# Patient Record
Sex: MALE | Race: WHITE | NOT HISPANIC OR LATINO | Employment: FULL TIME | URBAN - METROPOLITAN AREA
[De-identification: names, ages, dates, MRNs, and addresses within clinical notes are randomized per-mention and may not be internally consistent; named-entity substitution may affect disease eponyms.]

---

## 2017-01-09 ENCOUNTER — TELEPHONE (OUTPATIENT)
Dept: UROLOGY | Facility: CLINIC | Age: 52
End: 2017-01-09

## 2017-01-09 DIAGNOSIS — N20.0 NEPHROLITHIASIS: Primary | ICD-10-CM

## 2017-01-09 RX ORDER — ALLOPURINOL 300 MG/1
300 TABLET ORAL DAILY
Qty: 90 TABLET | Refills: 3 | Status: SHIPPED | OUTPATIENT
Start: 2017-01-09 | End: 2017-11-26 | Stop reason: SDUPTHER

## 2017-01-09 NOTE — TELEPHONE ENCOUNTER
----- Message from Dae Valente MD sent at 1/9/2017  9:15 AM CST -----  Reviewed 24 hour urinary results:   Discussed elevated urinary sodium, oxalate, and uric acid.  Recommend low oxalate, low sodium, low animal protein diet.  Also allopurinol.   Also discussed weight loss for control of kidney stones. Can continue k-citrate.   Also ordered CMP, please arrange.  Will repeat 24 hour urine in 6 months.

## 2017-01-10 ENCOUNTER — LAB VISIT (OUTPATIENT)
Dept: LAB | Facility: HOSPITAL | Age: 52
End: 2017-01-10
Attending: UROLOGY
Payer: COMMERCIAL

## 2017-01-10 DIAGNOSIS — N20.0 NEPHROLITHIASIS: ICD-10-CM

## 2017-01-10 PROCEDURE — 80053 COMPREHEN METABOLIC PANEL: CPT

## 2017-01-10 PROCEDURE — 36415 COLL VENOUS BLD VENIPUNCTURE: CPT | Mod: PO

## 2017-01-11 LAB
ALBUMIN SERPL BCP-MCNC: 3.8 G/DL
ALP SERPL-CCNC: 78 U/L
ALT SERPL W/O P-5'-P-CCNC: 42 U/L
ANION GAP SERPL CALC-SCNC: 10 MMOL/L
AST SERPL-CCNC: 28 U/L
BILIRUB SERPL-MCNC: 0.5 MG/DL
BUN SERPL-MCNC: 21 MG/DL
CALCIUM SERPL-MCNC: 8.8 MG/DL
CHLORIDE SERPL-SCNC: 106 MMOL/L
CO2 SERPL-SCNC: 22 MMOL/L
CREAT SERPL-MCNC: 1.3 MG/DL
EST. GFR  (AFRICAN AMERICAN): >60 ML/MIN/1.73 M^2
EST. GFR  (NON AFRICAN AMERICAN): >60 ML/MIN/1.73 M^2
GLUCOSE SERPL-MCNC: 174 MG/DL
POTASSIUM SERPL-SCNC: 4.3 MMOL/L
PROT SERPL-MCNC: 6.7 G/DL
SODIUM SERPL-SCNC: 138 MMOL/L

## 2017-03-13 ENCOUNTER — HOSPITAL ENCOUNTER (OUTPATIENT)
Dept: RADIOLOGY | Facility: HOSPITAL | Age: 52
Discharge: HOME | End: 2017-03-13
Attending: INTERNAL MEDICINE
Payer: COMMERCIAL

## 2017-03-13 DIAGNOSIS — N18.2 CHRONIC KIDNEY DISEASE (CKD), STAGE II (MILD): ICD-10-CM

## 2017-03-13 PROCEDURE — 93975 VASCULAR STUDY: CPT | Mod: TC

## 2017-03-13 PROCEDURE — 93975 VASCULAR STUDY: CPT | Mod: 26,,, | Performed by: RADIOLOGY

## 2017-03-13 PROCEDURE — 76770 US EXAM ABDO BACK WALL COMP: CPT | Mod: 26,,, | Performed by: RADIOLOGY

## 2017-11-27 RX ORDER — ALLOPURINOL 300 MG/1
TABLET ORAL
Qty: 90 TABLET | Refills: 3 | Status: SHIPPED | OUTPATIENT
Start: 2017-11-27 | End: 2018-05-17 | Stop reason: SDUPTHER

## 2018-05-17 ENCOUNTER — OFFICE VISIT (OUTPATIENT)
Dept: UROLOGY | Facility: CLINIC | Age: 53
End: 2018-05-17
Payer: COMMERCIAL

## 2018-05-17 VITALS
BODY MASS INDEX: 40.09 KG/M2 | WEIGHT: 280 LBS | SYSTOLIC BLOOD PRESSURE: 136 MMHG | DIASTOLIC BLOOD PRESSURE: 87 MMHG | HEART RATE: 85 BPM | HEIGHT: 70 IN

## 2018-05-17 DIAGNOSIS — Z12.5 PROSTATE CANCER SCREENING: ICD-10-CM

## 2018-05-17 DIAGNOSIS — N20.0 NEPHROLITHIASIS: Primary | ICD-10-CM

## 2018-05-17 DIAGNOSIS — N52.9 ERECTILE DYSFUNCTION, UNSPECIFIED ERECTILE DYSFUNCTION TYPE: ICD-10-CM

## 2018-05-17 PROCEDURE — 87086 URINE CULTURE/COLONY COUNT: CPT

## 2018-05-17 PROCEDURE — 81002 URINALYSIS NONAUTO W/O SCOPE: CPT | Mod: S$GLB,,, | Performed by: UROLOGY

## 2018-05-17 PROCEDURE — 99215 OFFICE O/P EST HI 40 MIN: CPT | Mod: 25,S$GLB,, | Performed by: UROLOGY

## 2018-05-17 PROCEDURE — 3008F BODY MASS INDEX DOCD: CPT | Mod: CPTII,S$GLB,, | Performed by: UROLOGY

## 2018-05-17 PROCEDURE — 99999 PR PBB SHADOW E&M-EST. PATIENT-LVL III: CPT | Mod: PBBFAC,,, | Performed by: UROLOGY

## 2018-05-17 RX ORDER — ALLOPURINOL 300 MG/1
300 TABLET ORAL DAILY
Qty: 90 TABLET | Refills: 3 | Status: SHIPPED | OUTPATIENT
Start: 2018-05-17

## 2018-05-17 RX ORDER — GLYBURIDE-METFORMIN HYDROCHLORIDE 5; 500 MG/1; MG/1
TABLET ORAL
COMMUNITY

## 2018-05-17 RX ORDER — ALLOPURINOL 300 MG/1
TABLET ORAL
COMMUNITY
End: 2018-05-17

## 2018-05-17 RX ORDER — AZITHROMYCIN 250 MG/1
TABLET, FILM COATED ORAL
COMMUNITY
Start: 2018-04-13 | End: 2018-12-20 | Stop reason: CLARIF

## 2018-05-17 RX ORDER — CIPROFLOXACIN 500 MG/1
TABLET ORAL
Refills: 0 | COMMUNITY
Start: 2018-05-08 | End: 2018-12-20 | Stop reason: CLARIF

## 2018-05-17 RX ORDER — SILDENAFIL CITRATE 20 MG/1
TABLET ORAL
Qty: 20 TABLET | Refills: 11 | Status: SHIPPED | OUTPATIENT
Start: 2018-05-17 | End: 2019-06-27 | Stop reason: SDUPTHER

## 2018-05-17 RX ORDER — PANTOPRAZOLE SODIUM 40 MG/1
TABLET, DELAYED RELEASE ORAL
COMMUNITY
End: 2018-05-17

## 2018-05-17 RX ORDER — DULAGLUTIDE 0.75 MG/.5ML
INJECTION, SOLUTION SUBCUTANEOUS
Refills: 5 | COMMUNITY
Start: 2018-04-30 | End: 2022-10-07

## 2018-05-17 RX ORDER — ATORVASTATIN CALCIUM 20 MG/1
TABLET, FILM COATED ORAL
COMMUNITY
End: 2018-12-20 | Stop reason: CLARIF

## 2018-05-17 RX ORDER — AMITRIPTYLINE HYDROCHLORIDE 10 MG/1
TABLET, FILM COATED ORAL
Refills: 2 | COMMUNITY
Start: 2018-05-07 | End: 2022-10-07

## 2018-05-17 RX ORDER — PREGABALIN 75 MG/1
CAPSULE ORAL
Refills: 0 | COMMUNITY
Start: 2018-03-06 | End: 2018-12-20 | Stop reason: CLARIF

## 2018-05-17 RX ORDER — ATORVASTATIN CALCIUM 40 MG/1
TABLET, FILM COATED ORAL
COMMUNITY

## 2018-05-17 RX ORDER — OXYCODONE AND ACETAMINOPHEN 5; 325 MG/1; MG/1
TABLET ORAL
Refills: 0 | Status: ON HOLD | COMMUNITY
Start: 2018-05-07 | End: 2018-12-28 | Stop reason: HOSPADM

## 2018-05-17 RX ORDER — RAMIPRIL 5 MG/1
CAPSULE ORAL
COMMUNITY

## 2018-05-17 RX ORDER — LEVOCETIRIZINE DIHYDROCHLORIDE 5 MG/1
TABLET, FILM COATED ORAL
Refills: 0 | COMMUNITY
Start: 2018-05-08 | End: 2018-12-20 | Stop reason: CLARIF

## 2018-05-17 RX ORDER — POTASSIUM CITRATE 10 MEQ/1
TABLET, EXTENDED RELEASE ORAL
Qty: 180 TABLET | Refills: 3 | Status: SHIPPED | OUTPATIENT
Start: 2018-05-17 | End: 2018-12-20 | Stop reason: CLARIF

## 2018-05-17 RX ORDER — BENZONATATE 200 MG/1
CAPSULE ORAL
Refills: 0 | COMMUNITY
Start: 2018-05-08 | End: 2018-12-20 | Stop reason: CLARIF

## 2018-05-17 RX ORDER — DICLOFENAC SODIUM 20 MG/G
SOLUTION TOPICAL
Refills: 3 | COMMUNITY
Start: 2018-04-03 | End: 2022-10-07

## 2018-05-17 RX ORDER — PROMETHAZINE HYDROCHLORIDE AND DEXTROMETHORPHAN HYDROBROMIDE 6.25; 15 MG/5ML; MG/5ML
SYRUP ORAL
Refills: 0 | COMMUNITY
Start: 2018-05-08 | End: 2018-12-20 | Stop reason: CLARIF

## 2018-05-17 RX ORDER — FENOFIBRATE 145 MG/1
TABLET, FILM COATED ORAL
COMMUNITY

## 2018-05-18 LAB — BACTERIA UR CULT: NO GROWTH

## 2018-05-18 NOTE — PROGRESS NOTES
HPI:  Erick Espinosa is a 53 y.o. year old male that  presents with No chief complaint on file.  .  This patient referred himself to the office with possible kidney stones.  Patient is new to me however last saw [facility] December 2016 whose note is reviewed.  This shows history of uric acid stones on potassium citrate.  At that time he was also prescribed allopurinol.  CT scan was obtained which showed no stones.  This image reviewed by me agree with this finding.  Twenty-four urine was obtained with results as detailed in the chart.  Patient has prior ureteroscopy with laser lithotripsy    Patient states he has a strong stream with nocturia times 0 1 and no strain to void.  He has no dysuria    Patient also complains of difficulty maintaining erections    There is no family history of kidney bladder or prostate cancer.  In January 2017 GFR greater than 60 and in 2010 PSA was 0.27.  In 2014 urine culture was negative    Patient states that he has occasional right flank pain.  Patient has no nausea vomiting      Past Medical History:   Diagnosis Date    Diabetes mellitus     Diabetes mellitus type II     High triglycerides     Hypertension     takes for kidneys    Kidney stone     Urinary tract infection      Social History     Social History    Marital status:      Spouse name: N/A    Number of children: N/A    Years of education: N/A     Occupational History    Not on file.     Social History Main Topics    Smoking status: Former Smoker     Types: Cigarettes     Quit date: 1/1/2009    Smokeless tobacco: Never Used    Alcohol use No    Drug use: No    Sexual activity: Yes     Partners: Female     Other Topics Concern    Not on file     Social History Narrative    No narrative on file     Past Surgical History:   Procedure Laterality Date    EXTRACORPOREAL SHOCK WAVE LITHOTRIPSY      LITHOTRIPSY       Family History   Problem Relation Age of Onset    Diabetes Mother     Heart  "attack Mother     Heart attack Father     Prostate cancer Neg Hx     Kidney disease Neg Hx            Review of Systems  The patient has no chest pains.  The patient has no shortness of breath  Patient wears        glasses.  All other review of systems are negative.      Physical Exam:  /87 (BP Location: Right arm, Patient Position: Sitting)   Pulse 85   Ht 5' 10" (1.778 m)   Wt 127 kg (280 lb)   BMI 40.18 kg/m²   General appearance: alert, cooperative, no distress  Constitutional:Oriented to person, place, and time.appears well-developed and well-nourished.   HEENT: Normocephalic, atraumatic, neck symmetrical, no nasal discharge   Eyes: conjunctivae/corneas clear, PERRL, EOM's intact  Lungs: clear to auscultation bilaterally, no dullness to percussion bilaterally  Heart: regular rate and rhythm without rub; no displacement of the PMI   Abdomen: soft, non-tender; bowel sounds normoactive; no organomegaly  :  Penis/perineum without lesions, scrotum without rash/cysts, epididymis nontender bilaterally, urethral meatus in normal location normal size, no penile plaques palpated, prostate:    Smooth enlarged benign-feeling                   seminal vesicles not palpated.  No rectal masses, sphincter tone normal.  Testes equal in size without masses  Extremities: extremities symmetric; no clubbing, cyanosis, or edema  Integument: Skin color, texture, turgor normal; no rashes; hair distrubution normal  Neurologic: Alert and oriented X 3, normal strength, normal coordination and gait  Psychiatric: no pressured speech; normal affect; no evidence of impaired cognition     LABS:    Complete Blood Count  Lab Results   Component Value Date    RBC 5.63 06/05/2013    HGB 14.5 08/15/2014    HCT 42.4 08/15/2014    MCV 82.1 06/05/2013    MCH 28.4 06/05/2013    MCHC 34.6 06/05/2013    RDW 13.6 06/05/2013     06/05/2013    MPV 10.6 06/05/2013    GRAN 3.8 06/05/2013    GRAN 56.2 06/05/2013    LYMPH 33.6 06/05/2013 "    LYMPH 2.3 06/05/2013    MONO 7.3 06/05/2013    MONO 0.5 06/05/2013    EOS 0.1 06/05/2013    BASO 0.1 06/05/2013    EOSINOPHIL 1.5 06/05/2013    BASOPHIL 0.7 06/05/2013       Comprehensive Metabolic Panel  Lab Results   Component Value Date     (H) 01/10/2017    BUN 21 (H) 01/10/2017    CREATININE 1.3 01/10/2017     01/10/2017    K 4.3 01/10/2017     01/10/2017    PROT 6.7 01/10/2017    ALBUMIN 3.8 01/10/2017    BILITOT 0.5 01/10/2017    AST 28 01/10/2017    ALKPHOS 78 01/10/2017    CO2 22 (L) 01/10/2017    ALT 42 01/10/2017    ANIONGAP 10 01/10/2017    EGFRNONAA >60.0 01/10/2017    ESTGFRAFRICA >60.0 01/10/2017       PSA  Lab Results   Component Value Date    PSA 0.27 02/08/2010         Assessment:    ICD-10-CM ICD-9-CM    1. Nephrolithiasis N20.0 592.0 Urine culture      Basic metabolic panel   2. Erectile dysfunction, unspecified erectile dysfunction type N52.9 607.84    3. Prostate cancer screening Z12.5 V76.44 PSA, Screening     The primary encounter diagnosis was Nephrolithiasis. Diagnoses of Erectile dysfunction, unspecified erectile dysfunction type and Prostate cancer screening were also pertinent to this visit.      Plan:  1.  Nephrolithiasis.  Plan.  Prescriptions renewed for Urocit-K and allopurinol.  The I discussed repeat 24 urine however patient does not want to have this done.  Will check stone protocol CT which the patient states he wants to have done at [facility].  We will contact the patient with the results    2.  Erectile dysfunction.  Plan.  Discussed that this is related to his diabetes and hypertension.  Risks and benefits of generic Viagra discussed  Prescription therefore written for sildenafil 20 mg.    The patient instructed to start with 1 by mouth 1 hour before sex and increase in a stepwise fashion to maximum 5 by mouth 1 hour before sex.   Risks  of sildenafil including headache, upset stomach, change in the way that colors look, and prolonged erection  discussed with the patient.  The need to go to emergency room if he has an erection lasting more than 4 hours discussed.  If the patient is on an alpha-blocker, the need for  a 4 hour time difference between taking the sildenafil and the alpha-blocker discussed.  Patient voices understanding.  Prescription for sildenafil 20 mg given.     3.  Prostate cancer screening.  I discussed risks and benefits and controversy concerning prostate cancer screening.  Patient voices understanding and wants to have it done.  Will contact patient with result    Follow-up 1 year or sooner as dictated by above mentioned tests  Orders Placed This Encounter   Procedures    Urine culture    Basic metabolic panel    PSA, Screening           Trevor Veronica MD    PLEASE NOTE:  Please be advised that portions of this note were dictated using voice recognition software and may contain dictation related errors in spelling/grammar/appropriate pronouns/syntax or other errors that might have not been found and or corrected on text review.

## 2018-05-21 ENCOUNTER — TELEPHONE (OUTPATIENT)
Dept: UROLOGY | Facility: CLINIC | Age: 53
End: 2018-05-21

## 2018-05-21 ENCOUNTER — LAB VISIT (OUTPATIENT)
Dept: LAB | Facility: HOSPITAL | Age: 53
End: 2018-05-21
Attending: UROLOGY
Payer: COMMERCIAL

## 2018-05-21 DIAGNOSIS — Z12.5 PROSTATE CANCER SCREENING: ICD-10-CM

## 2018-05-21 DIAGNOSIS — N20.0 NEPHROLITHIASIS: ICD-10-CM

## 2018-05-21 LAB
ANION GAP SERPL CALC-SCNC: 9 MMOL/L
BILIRUB SERPL-MCNC: ABNORMAL MG/DL
BLOOD URINE, POC: ABNORMAL
BUN SERPL-MCNC: 25 MG/DL
CALCIUM SERPL-MCNC: 10.3 MG/DL
CHLORIDE SERPL-SCNC: 106 MMOL/L
CO2 SERPL-SCNC: 24 MMOL/L
COLOR, POC UA: YELLOW
COMPLEXED PSA SERPL-MCNC: 0.55 NG/ML
CREAT SERPL-MCNC: 1.4 MG/DL
EST. GFR  (AFRICAN AMERICAN): >60 ML/MIN/1.73 M^2
EST. GFR  (NON AFRICAN AMERICAN): 58 ML/MIN/1.73 M^2
GLUCOSE SERPL-MCNC: 111 MG/DL
GLUCOSE UR QL STRIP: 250
KETONES UR QL STRIP: ABNORMAL
LEUKOCYTE ESTERASE URINE, POC: ABNORMAL
NITRITE, POC UA: ABNORMAL
PH, POC UA: 5
POTASSIUM SERPL-SCNC: 4.3 MMOL/L
PROTEIN, POC: ABNORMAL
SODIUM SERPL-SCNC: 139 MMOL/L
SPECIFIC GRAVITY, POC UA: 1
UROBILINOGEN, POC UA: NORMAL

## 2018-05-21 PROCEDURE — 80048 BASIC METABOLIC PNL TOTAL CA: CPT

## 2018-05-21 PROCEDURE — 36415 COLL VENOUS BLD VENIPUNCTURE: CPT

## 2018-05-21 PROCEDURE — 84153 ASSAY OF PSA TOTAL: CPT

## 2018-05-21 NOTE — TELEPHONE ENCOUNTER
----- Message from Trevor Veronica MD sent at 5/21/2018  8:03 AM CDT -----  Please contact patient and let patient know that recent urine culture was negative.

## 2018-05-23 ENCOUNTER — TELEPHONE (OUTPATIENT)
Dept: UROLOGY | Facility: CLINIC | Age: 53
End: 2018-05-23

## 2018-05-23 NOTE — TELEPHONE ENCOUNTER
----- Message from Evie Mujica sent at 5/23/2018  2:39 PM CDT -----  Contact: [phone]/self  Patient requesting to speak with you regarding his CT scan orders. Please advise.

## 2018-05-23 NOTE — TELEPHONE ENCOUNTER
Return pt.call he wanted to know was the order for an CT fax to [facility]. Orders was fax over and I told pt.he may call them to find out about how long will it take for an appt.

## 2018-05-23 NOTE — TELEPHONE ENCOUNTER
----- Message from Julieta Martinez sent at 5/23/2018  3:30 PM CDT -----  Contact: edinson with [facility] [phone] ref # [id number]  Rep needed to speak with the nurse about the potassium citrate (UROCIT-K) 10 mEq (1,080 mg) Tb SR. Rep advised they tablets come 100 to a bottle and wanted to know if they can give the patient 2 bottles (200 tablets). Please call and advise.

## 2018-05-23 NOTE — TELEPHONE ENCOUNTER
Spoke with pharmacist, to dispense 2 bottles of Urocit-K 10 meq quantity of 100 tablets per bottle as this will maximize patient's pharmacy benefits for 90 day supply.

## 2018-05-24 ENCOUNTER — TELEPHONE (OUTPATIENT)
Dept: UROLOGY | Facility: CLINIC | Age: 53
End: 2018-05-24

## 2018-05-24 NOTE — TELEPHONE ENCOUNTER
----- Message from Trevor Veronica MD sent at 5/24/2018 11:06 AM CDT -----  Contact patient and inform him that serum PSA was normal

## 2018-05-29 ENCOUNTER — TELEPHONE (OUTPATIENT)
Dept: UROLOGY | Facility: CLINIC | Age: 53
End: 2018-05-29

## 2018-05-29 NOTE — TELEPHONE ENCOUNTER
----- Message from Socorro Mello sent at 5/29/2018  4:17 PM CDT -----  Contact: Sade w/ [facility] [phone]      ----- Message -----  From: Milana Hernandez  Sent: 5/29/2018   2:09 PM  To: Jeremías Stuart is requesting an authorization for a CT scan. Please advise.

## 2018-09-26 ENCOUNTER — DOCUMENTATION ONLY (OUTPATIENT)
Dept: UROLOGY | Facility: CLINIC | Age: 53
End: 2018-09-26

## 2018-09-26 NOTE — PROGRESS NOTES
CT result from [facility] dated 06/05/2018 shows no evidence of stone or obstruction.  No made of stable asymmetry of the kidneys with right greater than left.    Reading is to be scanned into the computer

## 2018-10-17 ENCOUNTER — HOSPITAL ENCOUNTER (OUTPATIENT)
Dept: RADIOLOGY | Facility: HOSPITAL | Age: 53
Discharge: HOME | End: 2018-10-17
Attending: ORTHOPAEDIC SURGERY
Payer: COMMERCIAL

## 2018-10-17 ENCOUNTER — OFFICE VISIT (OUTPATIENT)
Dept: SPORTS MEDICINE | Facility: CLINIC | Age: 53
End: 2018-10-17
Payer: COMMERCIAL

## 2018-10-17 VITALS
DIASTOLIC BLOOD PRESSURE: 83 MMHG | SYSTOLIC BLOOD PRESSURE: 131 MMHG | HEIGHT: 70 IN | WEIGHT: 280 LBS | HEART RATE: 81 BPM | BODY MASS INDEX: 40.09 KG/M2

## 2018-10-17 DIAGNOSIS — M25.511 RIGHT SHOULDER PAIN, UNSPECIFIED CHRONICITY: ICD-10-CM

## 2018-10-17 DIAGNOSIS — M75.41 ROTATOR CUFF IMPINGEMENT SYNDROME OF RIGHT SHOULDER: Primary | ICD-10-CM

## 2018-10-17 PROCEDURE — 99999 PR PBB SHADOW E&M-EST. PATIENT-LVL III: CPT | Mod: PBBFAC,,, | Performed by: ORTHOPAEDIC SURGERY

## 2018-10-17 PROCEDURE — 73030 X-RAY EXAM OF SHOULDER: CPT | Mod: TC,FY,PO,RT

## 2018-10-17 PROCEDURE — 73030 X-RAY EXAM OF SHOULDER: CPT | Mod: 26,RT,, | Performed by: RADIOLOGY

## 2018-10-17 PROCEDURE — 3008F BODY MASS INDEX DOCD: CPT | Mod: CPTII,S$GLB,, | Performed by: ORTHOPAEDIC SURGERY

## 2018-10-17 PROCEDURE — 20610 DRAIN/INJ JOINT/BURSA W/O US: CPT | Mod: RT,S$GLB,, | Performed by: ORTHOPAEDIC SURGERY

## 2018-10-17 PROCEDURE — 99204 OFFICE O/P NEW MOD 45 MIN: CPT | Mod: 25,S$GLB,, | Performed by: ORTHOPAEDIC SURGERY

## 2018-10-17 RX ORDER — TRIAMCINOLONE ACETONIDE 40 MG/ML
80 INJECTION, SUSPENSION INTRA-ARTICULAR; INTRAMUSCULAR
Status: DISCONTINUED | OUTPATIENT
Start: 2018-10-17 | End: 2018-10-17 | Stop reason: HOSPADM

## 2018-10-17 RX ADMIN — TRIAMCINOLONE ACETONIDE 80 MG: 40 INJECTION, SUSPENSION INTRA-ARTICULAR; INTRAMUSCULAR at 12:10

## 2018-10-17 NOTE — PROCEDURES
Large Joint Aspiration/Injection: R subacromial bursa  Date/Time: 10/17/2018 12:35 PM  Performed by: Malgorzata Hall MD  Authorized by: Malgorzata Hall MD     Consent Done?:  Yes (Verbal)  Indications:  Pain  Procedure site marked: Yes    Timeout: Prior to procedure the correct patient, procedure, and site was verified      Location:  Shoulder  Site:  R subacromial bursa  Prep: Patient was prepped and draped in usual sterile fashion    Needle size:  22 G  Approach:  Posterior  Medications:  80 mg triamcinolone acetonide 40 mg/mL  Patient tolerance:  Patient tolerated the procedure well with no immediate complications

## 2018-10-17 NOTE — Clinical Note
October 17, 2018      [facility]            [facility]  [address]  [facility] [address]  Phone: [phone]          Patient: Erick Espinosa   MR Number: [id number]   YOB: 1965   Date of Visit: 10/17/2018       Dear [facility] :    Thank you for referring Erick Espinosa to me for evaluation. Attached you will find relevant portions of my assessment and plan of care.    If you have questions, please do not hesitate to call me. I look forward to following Erick Espinosa along with you.    Sincerely,    Malgorzata Hall MD    Enclosure  CC:  No Recipients    If you would like to receive this communication electronically, please contact [email or web address] or [phone] to request more information on [employer] access.    For providers and/or their staff who would like to refer a patient to [facility], please contact us through our one-stop-shop provider referral line, Elizabeth Joseph, at [phone].    If you feel you have received this communication in error or would no longer like to receive these types of communications, please e-mail [email or web address]

## 2018-10-17 NOTE — PROGRESS NOTES
CC: right shoulder pain     53 y.o. right hand dominant Male [employer]  reports that the pain is severe and not responding to any conservative care. He points to the anterolateral shoulder as the location of his pain. Pain has been present for the past 2 months. He denies trauma or injury. He endorses decreased ROM. He has had no prior treatment.     He reports that the pain is worse with overhead activity. It also bothers him at night.    Is affecting ADLs.     He had a left shoulder arthroscopic rotator cuff tear repair by Dr. Ajay Bolaños previously. 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     Review of Systems   Constitution: Negative. Negative for chills, fever and night sweats.   HENT: Negative for congestion and headaches.    Eyes: Negative for blurred vision, left vision loss and right vision loss.   Cardiovascular: Negative for chest pain and syncope.   Respiratory: Negative for cough and shortness of breath.    Endocrine: Negative for polydipsia, polyphagia and polyuria.   Hematologic/Lymphatic: Negative for bleeding problem. Does not bruise/bleed easily.   Skin: Negative for dry skin, itching and rash.   Musculoskeletal: Negative for falls and muscle weakness.   Gastrointestinal: Negative for abdominal pain and bowel incontinence.   Genitourinary: Negative for bladder incontinence and nocturia.   Neurological: Negative for disturbances in coordination, loss of balance and seizures.   Psychiatric/Behavioral: Negative for depression. The patient does not have insomnia.    Allergic/Immunologic: Negative for hives and persistent infections.     PAST MEDICAL HISTORY:   Past Medical History:   Diagnosis Date    Diabetes mellitus     Diabetes mellitus type II     High triglycerides     Hypertension     takes for kidneys    Kidney stone     Urinary tract infection      PAST SURGICAL HISTORY:   Past Surgical History:   Procedure  Laterality Date    CYSTOSCOPY WITH RETROGRADE PYELOGRAM Left 2014    [facility]    CYSTOSCOPY WITH STENT PLACEMENT Left 2014    [facility]    CYSTOSCOPY WITH STENT PLACEMENT Left 2014    [facility]    EXTRACORPOREAL SHOCK WAVE LITHOTRIPSY      EXTRACTION-STONE-URETEROSCOPY Left 2014    [facility]    LITHOTRIPSY      REMOVAL-STENT-URETERAL Left 10/8/2014    [facility] at [facility]     FAMILY HISTORY:   Family History   Problem Relation Age of Onset    Diabetes Mother     Heart attack Mother     Heart attack Father     Prostate cancer Neg Hx     Kidney disease Neg Hx      SOCIAL HISTORY:   Social History     Socioeconomic History    Marital status:      Spouse name: Not on file    Number of children: Not on file    Years of education: Not on file    Highest education level: Not on file   Social Needs    Financial resource strain: Not on file    Food insecurity - worry: Not on file    Food insecurity - inability: Not on file    Transportation needs - medical: Not on file    Transportation needs - non-medical: Not on file   Occupational History    Not on file   Tobacco Use    Smoking status: Former Smoker     Types: Cigarettes     Last attempt to quit: 2009     Years since quittin.7    Smokeless tobacco: Never Used   Substance and Sexual Activity    Alcohol use: No    Drug use: No    Sexual activity: Yes     Partners: Female   Other Topics Concern    Not on file   Social History Narrative    Not on file       MEDICATIONS:   Current Outpatient Medications:     allopurinol (ZYLOPRIM) 300 MG tablet, Take 1 tablet (300 mg total) by mouth once daily., Disp: 90 tablet, Rfl: 3    atorvastatin (LIPITOR) 40 MG tablet, atorvastatin 40 mg tablet, Disp: , Rfl:     canagliflozin (INVOKANA) 100 mg Tab, Invokana  "100 mg tablet, Disp: , Rfl:     fenofibrate (TRICOR) 145 MG tablet, fenofibrate nanocrystallized 145 mg tablet, Disp: , Rfl:     glyBURIDE-metformin 5-500 mg (GLUCOVANCE) 5-500 mg Tab, Take 2 tablets by mouth 2 (two) times daily with meals., Disp: 360 tablet, Rfl: 3    oxyCODONE-acetaminophen (PERCOCET) 5-325 mg per tablet, TK 1 T PO BID PRN P, Disp: , Rfl: 0    PENNSAID 20 mg/gram /actuation(2 %) sopm, APPLY 2 PUMPS TO THE AFFECTED KNEE(S) TWICE DAILY, Disp: , Rfl: 3    ramipril (ALTACE) 5 MG capsule, ramipril 5 mg capsule, Disp: , Rfl:     TRULICITY 0.75 mg/0.5 mL PnIj, INJECT 0.75 MG ONCE A WEEK, Disp: , Rfl: 5    amitriptyline (ELAVIL) 10 MG tablet, TK 1 T PO QHS MAY INCREASE TO 2 TS PO QHS AFTER 5 DAYS, Disp: , Rfl: 2    atorvastatin (LIPITOR) 20 MG tablet, atorvastatin 20 mg tablet, Disp: , Rfl:     azithromycin (Z-JALYN) 250 MG tablet, , Disp: , Rfl:     BD INSULIN PEN NEEDLE UF MINI 31 x 3/16 " Ndle, , Disp: , Rfl:     benzonatate (TESSALON) 200 MG capsule, TK 1 C PO TID PRF COUGH, Disp: , Rfl: 0    canagliflozin (INVOKANA) 300 mg Tab tablet, Invokana 300 mg tablet, Disp: , Rfl:     ciprofloxacin HCl (CIPRO) 500 MG tablet, TK 1 T PO  BID, Disp: , Rfl: 0    diazePAM (VALIUM) 5 MG tablet, Take 1 tablet by mouth as needed., Disp: , Rfl: 2    ERGOCALCIFEROL, VITAMIN D2, (VITAMIN D ORAL), Take 1 tablet by mouth once daily., Disp: , Rfl:     gemfibrozil (LOPID) 600 MG tablet, Take 1 tablet by mouth once daily., Disp: , Rfl:     glyBURIDE-metformin 5-500 mg (GLUCOVANCE) 5-500 mg Tab, glyburide 5 mg-metformin 500 mg tablet, Disp: , Rfl:     ibuprofen (ADVIL,MOTRIN) 800 MG tablet, Take 1 tablet by mouth daily as needed., Disp: , Rfl: 0    INVOKANA 100 mg Tab, Take 1 tablet by mouth once daily., Disp: , Rfl: 1    levocetirizine (XYZAL) 5 MG tablet, TK 1 T PO QD, Disp: , Rfl: 0    linagliptin (TRADJENTA) 5 mg Tab tablet, Tradjenta 5 mg tablet, Disp: , Rfl:     LYRICA 75 mg capsule, TK 1 C PO BID, " "Disp: , Rfl: 0    mv with min-FA-lycopene-ginkgo (ONE-A-DAY MEN'S 50+ ADVANTAGE) 400-300-120 mcg-mcg-mg Tab, Take 1 tablet by mouth once daily., Disp: , Rfl:     NOVOFINE 32 32 x 1/4 " Ndle, , Disp: , Rfl:     potassium citrate (UROCIT-K) 10 mEq (1,080 mg) TbSR, 1 po bid, Disp: 180 tablet, Rfl: 3    promethazine-dextromethorphan (PROMETHAZINE-DM) 6.25-15 mg/5 mL Syrp, , Disp: , Rfl: 0    ramipril (ALTACE) 5 MG capsule, , Disp: , Rfl:     sildenafil (REVATIO) 20 mg Tab, Take 1 by mouth 1 hour prior to sex, increased stepwise as needed  up to max 5 by mouth 1 hourr prior to sex, Disp: 20 tablet, Rfl: 11    zolpidem (AMBIEN) 10 mg Tab, Take 1 tablet by mouth nightly as needed., Disp: , Rfl: 3  ALLERGIES:   Review of patient's allergies indicates:   Allergen Reactions    Sulfa (sulfonamide antibiotics)        VITAL SIGNS: /83   Pulse 81   Ht 5' 10" (1.778 m)   Wt 127 kg (280 lb)   BMI 40.18 kg/m²      PHYSICAL EXAMINATION:  General:  The patient is alert and oriented x 3.  Mood is pleasant.  Observation of ears, eyes and nose reveal no gross abnormalities.  No labored breathing observed.  Gait is coordinated. Patient can toe walk and heel walk without difficulty.      right Shoulder / Upper Extremity Exam    OBSERVATION:     Swelling  none  Deformity  none   Discoloration  none   Scapular winging none   Scars   none  Atrophy  none    TENDERNESS / CREPITUS (T/C):          T/C      T/C   Clavicle   -/-  SUPRAspinatus    -/-     AC Jt.    -/-  INFRAspinatus  -/-    SC Jt.    -/-  Deltoid    -/-      G. Tuberosity  -/-  LH BICEP groove  +/-   Acromion:  -/-  Midline Neck   -/-     Scapular Spine -/-  Trapezium   -/-   SMA Scapula  -/-  GH jt. line - post  -/-     Scapulothoracic  -/-         ROM: (* = with pain)  Right shoulder   Left shoulder        AROM (PROM)   AROM (PROM)   FE    160° (160°)     170° (175°)     ER at 0°    45°  (65°)    40°  (65°)   ER at 90° ABD  90°  (90°)    80°  (80°)   IR at 90° "  ABD   NA  (40°)     NA  (40°)      IR (spine level)   L5     T10    STRENGTH: (* = with pain) RIGHT SHOULDER  LEFT SHOULDER   SCAPTION at 0  5/5*    5/5   SCAPTION at 30  5/5*    5/5    IR    5/5    5/5   ER    5/5    5/5   BICEPS   5/5    5/5   Deltoid    5/5    5/5     SIGNS:  Painful side       NEER   ++    ORODRIS  +    DASH   +    SPEEDS  neg     DROP ARM   neg   BELLY PRESS neg   Superior escape none    LIFT-OFF  neg   X-Body ADD    neg    MOVING VALGUS neg        STABILITY TESTING    RIGHT SHOULDER   LEFT SHOULDER     Translation     Anterior  up face     up face    Posterior  up face    up face    Sulcus   < 10mm    < 10 mm     Signs   Apprehension   neg      neg       Relocation   no change     no change      Jerk test  neg     neg    EXTREMITY NEURO-VASCULAR EXAM    Sensation grossly intact to light touch all dermatomal regions.    DTR 2+ Biceps, Triceps, BR and Negative Annas sign   Grossly intact motor function at Elbow, Wrist and Hand   Distal pulses radial and ulnar 2+, brisk cap refill, symmetric.      NECK:  Painless FROM and spinous processes non-tender. Negative Spurlings sign.      OTHER FINDINGS:  + mild scapular dyskinesia    XRAYS:  Shoulder trauma series right,  were obtained and reviewed  No convincing fracture or dislocation is noted. The osseous structures appear well mineralized and well aligned    MRI:  NA      ASSESSMENT:   right:  1. Shoulder pain, impingement   2.  Mild Scapular dyskinesia    PLAN:    1. PT for scapular stabilization: Scapular dyskinesia   Scapular stabilization - [place] protocol, 1-3x/week x 6-8 weeks with HEP    2. Subacromial injection, see note below   3. MRI right shoulder if pain not improving  4. Follow up 3 months    PROCEDURE NOTE:   After cortisone consent and post-injection information was given, the shoulder was prepped with betadyne and alcohol. The right subacromial space of the shoulder was injected with 2 cc 40 mg kenalog and 4 cc  lidocaine and 4 cc .5% marcaine.   Bandaid was applied. Patient was reminded to rest with RICE for 48 hours post injection and to call clinic immediately for any adverse side effects as explained in clinic today.          All questions were answered, pt will contact us for questions or concerns in the interim.

## 2018-10-22 ENCOUNTER — CLINICAL SUPPORT (OUTPATIENT)
Dept: REHABILITATION | Facility: HOSPITAL | Age: 53
End: 2018-10-22
Payer: COMMERCIAL

## 2018-10-22 DIAGNOSIS — M25.511 RIGHT SHOULDER PAIN, UNSPECIFIED CHRONICITY: ICD-10-CM

## 2018-10-22 PROCEDURE — 97161 PT EVAL LOW COMPLEX 20 MIN: CPT

## 2018-10-22 PROCEDURE — 97110 THERAPEUTIC EXERCISES: CPT

## 2018-10-23 NOTE — PATIENT INSTRUCTIONS
"YOUR HOMHEo PmROeGR EAMxercise Program  Created by miguel angel simon Oct 9th, 2018  View at "[email or web address]-code.[email or web address]" using code: [email or web address]  Total 4  Repeat 10 Times  Hold 3 Seconds  Complete 3 Sets  Perform 1 Time(s) a Day  Supine Serratus Punch  Lying on your back, raise both arms in front  to 90 degrees. Punch your fists towards the  ceiling lifting shoulder blades off of the mat.  Do not shrug shoulders.  Repeat 15 Times  Hold 1 Second  Complete 2 Sets  Perform 1 Time(s) a Day  FREE WEIGHT - EXTERNAL ROTATION -  ER  Lie on your side and hold a weight with your  elbow bent and rested on your side. Next,  draw up the your arm from the ground  towards the ceiling.  Repeat 15 Times  Complete 2 Sets  Perform 1 Time(s) a Day  PRONE MIDDLE TRAP  Lie on your stomach with your arm hanging  off table. Try to pull your shoulder blade  towards your spine (without shrugging), and  slowly lift your arm out to the side with your  palm up. STOP if there is any pain in the  shoulder.  Powered by [email or web address] Created By BULXa Oct 9th, 2018 - Page 1 of 2  Repeat 15 Times  Hold 1 Second  Complete 2 Sets  Perform 1 Time(s) a Day  Wall slides  For Improved Shoulder Flexion ROM:  Use wall as a guide and gently slide your  arms up  For Upward Rotation (Serratus Activation):  Push into wall as your arms slide  Think of letting your shoulder blades rotate  *Engage abdominals to prevent rib flare  and/or lumbar extension  Powered by [email or web address] Created By BULXa Oct 9th, 2018 - Page 2 of 2  "

## 2018-10-23 NOTE — PLAN OF CARE
[facility]  Physical Therapy Initial Evaluation    Name: Erick Espinosa  Clinic Number: [id number]    Therapy Diagnosis:   Encounter Diagnosis   Name Primary?    Right shoulder pain, unspecified chronicity      Physician: Jonathan Solis MD    Physician Orders: PT Eval and Treat  Medical Diagnosis: Right shoulder pain  Evaluation Date: 10/22/2018  Authorization Period Expiration: 12/31/2018  Plan of Care Certification Period: 2/25/2019  Visit # / Visits authorized: 1/20  Date of Surgery: NA  Precautions: Standard    Time In: 1234p  Time Out: 130p  Total Billable Time: 56 minutes    Subjective     Date of onset: Insidious over last 2 months  History of current condition - Erick reports: that he started having pain in right shoulder about 2 months ago with no specific mechanism.  He states that he is now having pain at times at rest, quick movements, reaching across body, reaching overhead, reaching out to the side.  He states that he has difficulty with sleeping due to pain.  He states that he does get short periods of relief from ice and tylenol.  He states that he feels pain is staying about the same over the last week.     Past Medical History:   Diagnosis Date    Diabetes mellitus     Diabetes mellitus type II     High triglycerides     Hypertension     takes for kidneys    Kidney stone     Urinary tract infection      Erick Espinosa  has a past surgical history that includes Lithotripsy; Extracorporeal shock wave lithotripsy; REMOVAL-STENT-URETERAL (Left, 10/8/2014); EXTRACTION-STONE-URETEROSCOPY (Left, 8/20/2014); CYSTOSCOPY WITH STENT PLACEMENT (Left, 8/20/2014); CYSTOSCOPY WITH RETROGRADE PYELOGRAM (Left, 8/1/2014); and CYSTOSCOPY WITH STENT PLACEMENT (Left, 8/1/2014).    Erick has a current medication list which includes the following prescription(s): allopurinol, amitriptyline, atorvastatin, atorvastatin, azithromycin, bd ultra-fine mini pen needle,  benzonatate, canagliflozin, canagliflozin, ciprofloxacin hcl, diazepam, ergocalciferol (vitamin d2), fenofibrate, gemfibrozil, glyburide-metformin 5-500 mg, glyburide-metformin 5-500 mg, ibuprofen, invokana, levocetirizine, linagliptin, lyrica, mv-mins-folic-lycopene-ginkgo, novofine 32, oxycodone-acetaminophen, pennsaid, potassium citrate, promethazine-dextromethorphan, ramipril, ramipril, sildenafil, trulicity, and zolpidem.    Review of patient's allergies indicates:   Allergen Reactions    Sulfa (sulfonamide antibiotics)         Prior Therapy: For previous rotator cuff repair  Social History: Lives in a house, lives with their spouse  Occupation: , [employer]  Prior Level of Function: No limitation with left UE reaching, lifting, carrying  Current Level of Function: Left UE limited ROM, pain with lifting, carrying, pushing, pulling    Pain:  Current 1/10, worst 6/10, best 1/10   Location: right shoulder   Description: Aching, Dull and Sharp    Pts goals: To resume full ROM with right UE    Objective     Scapular alignment:  Right scapula abducted and downwardly rotated    Range of Motion:   Shoulder Right (AROM/PROM) Left (AROM/PROM)   Flexion 101* degrees 161 degrees   Abduction 104* degrees 157 degrees   ER at 90 44 degrees 41 degrees   IR at 90 47* degrees 57 degrees     Strength:  Shoulder Right Left   Flexion pain/5 5/5   Abduction pain/5 4+/5   ER 4/5 4/5   IR 4+/5 5/5       Special Tests:  AC Joint Right Left   AC Joint Compression Test - -   Empty Can Test + -   Drop Arm test - -   Subscaputlaris Lift Off - -   Clunk test - -   Hawkin's Kenndy + -   Neer's Test + -   Speed's test - -   Anterior Apprehension test - -     CMS Impairment/Limitation/Restriction for FOTO Shoulder Survey    Therapist reviewed FOTO scores for Erick Espinosa on 10/22/2018.   FOTO documents entered into [employer] - see Media section.    Limitation Score: 59%  Category: Mobility    Current : CK = at least 40% but <  "60% impaired, limited or restricted  Goal: CJ = at least 20% but < 40% impaired, limited or restricted  Discharge:          TREATMENT     Treatment Time In: 1245p  Treatment Time Out: 120p  Total Treatment time separate from Evaluation time:35    Erick received therapeutic exercises to develop strength, endurance, ROM and posture for 35 minutes including:  SL ER - 2 x 15, 2#  Serratus punch - 2 x 15  Prone mid traps - 2 x 15  Sleeper stretch - 4 x 15"  Wall slides flexion - 2 x 10    Erick received the following manual therapy techniques for 0 minutes including:    Home Exercises and Patient Education Provided    Education provided re: therapeutic diagnosis and plan of care    Written Home Exercises Provided: Yes  Exercises were reviewed and Erick was able to demonstrate them prior to the end of the session.   Pt received a written copy of exercises to perform at home. Erick demonstrated good  understanding of the education provided.     See EMR under patient instructions for exercises given.     Assessment     Erick is a 53 y.o. male referred to outpatient Physical Therapy with a medical diagnosis of right shoulder pain that began about 2 months ago with no specific mechanism of onset. Pt presents with objective deficits in right shoulder P/AROM, tolerance for resisted motion, scapular movement timing that limits functional ability to reach overhead, reach behind back, lift, carry, sleep.    Pt prognosis is Good.   Pt will benefit from skilled outpatient Physical Therapy to address the deficits stated above and in the chart below, provide pt/family education, and to maximize pt's level of independence.     Plan of care discussed with patient: Yes  Pt's spiritual, cultural and educational needs considered and patient is agreeable to the plan of care and goals as stated below:     Anticipated Barriers for therapy: None    Medical Necessity is demonstrated by the following  History  Co-morbidities and personal factors " that may impact the plan of care Co-morbidities:   None    Personal Factors:   no deficits     low   Examination  Body Structures and Functions, activity limitations and participation restrictions that may impact the plan of care Body Regions:   upper extremities    Body Systems:    ROM  strength    Participation Restrictions:   None    Activity limitations:   Learning and applying knowledge  no deficits    General Tasks and Commands  no deficits    Communication  no deficits    Mobility  lifting and carrying objects    Self care  no deficits    Domestic Life  no deficits    Interactions/Relationships  no deficits    Life Areas  no deficits    Community and Social Life  no deficits         low   Clinical Presentation stable and uncomplicated low   Decision Making/ Complexity Score: low       Goals:  Short Term Goals:  4 weeks  1.Patient will demonstrate 155 degrees affected shoulder flexion PROM to demonstrate progression to functional AROM.  2.Patient will demonstrate 155 degrees affected shoulder abduction PROM to demonstrate progression to functional AROM.   3.Patient will demonstrate 4/5 strength of affected shoulder flexion to increase ease with reaching overhead.    Long Term Goals: 18 weeks  1. .Patient will demonstrate 160 degrees affected shoulder flexion and abduction AROM to demonstrate progression to functional AROM  2.Increase strength to >/= 4+/5 in shoulder flexion and abduction to increase tolerance for ADL and work activities.  3. Pt goal: To resume reaching and carrying without pain.    Plan   Certification Period/Plan of care expiration: 10/22/2018 to 2/25/2019.    Outpatient Physical Therapy 2 times weekly for 18 weeks to include the following interventions: manual therapy as needed, therapeutic exercises to address functional deficits, modalities prn, wound care prn, dry needling prn, treatment by a skilled PTA at times.    Cuco Guidry, PT

## 2018-10-30 ENCOUNTER — CLINICAL SUPPORT (OUTPATIENT)
Dept: REHABILITATION | Facility: HOSPITAL | Age: 53
End: 2018-10-30
Payer: COMMERCIAL

## 2018-10-30 DIAGNOSIS — M25.511 RIGHT SHOULDER PAIN, UNSPECIFIED CHRONICITY: ICD-10-CM

## 2018-10-30 PROCEDURE — 97140 MANUAL THERAPY 1/> REGIONS: CPT

## 2018-10-30 PROCEDURE — 97110 THERAPEUTIC EXERCISES: CPT

## 2018-10-30 NOTE — PROGRESS NOTES
"                            Physical Therapy Daily Treatment Note     Name: Erick Espinosa  Clinic Number: [id number]    Therapy Diagnosis:   Encounter Diagnosis   Name Primary?    Right shoulder pain, unspecified chronicity      Physician: Jonathan Solis MD  Physician Orders: PT Eval and Treat  Medical Diagnosis: Right shoulder pain  Evaluation Date: 10/22/2018  Authorization Period Expiration: 12/31/2018  Plan of Care Certification Period: 2/25/2019  Date of Surgery: NA  Precautions: Standard  Visit Date: 10/30/2018  Visit # / Visits authorized: 1/20    Time In: 1100a  Time Out: 1215p   Total Billable Time: 75 minutes    Subjective      Pt reports: he was compliant with home exercise program given last session.   Response to previous treatment: Patient states that he feels a little more pain today, 2/10 on average     Pain: 2/10  Location: right shoulder      Objective     Erick received therapeutic exercises to develop strength, ROM and posture for 40 minutes including:  Pulleys - 3' ea  Prone row/ext/mid trap/low trap - 3 x 10, 2#  Row - 2 x 20, blue, 5"  Er/IR step out - 3 x 10, green  Sleeper stretch - 4 x 20"    Erick received the following manual therapy techniques for 15 minutes, including:  PROM right shoulder all planes    Home Exercises Provided and Patient Education Provided     Education provided:   Continue current HEP    Written Home Exercises Provided: Continue with current HEP  Exercises were reviewed and Erick was able to demonstrate them prior to the end of the session.      Pt received a written copy of exercises to perform at home.   See EMR under patient instructions for exercises given.     Erick demonstrated good  understanding of the education provided.     Assessment     The patient demonstrates relief from pain with IR stretching and anterior joint stabilization  Erick is progressing well towards his goals.   Pt prognosis is Excellent.     Pt will continue to benefit from " skilled outpatient physical therapy to address the deficits listed in the problem list box on initial evaluation, provide pt/family education and to maximize pt's level of independence in the home and community environment.     Pt's spiritual, cultural and educational needs considered and pt agreeable to plan of care and goals.    Anticipated barriers to physical therapy: None    Goals:   Short Term Goals:  4 weeks  1.Patient will demonstrate 155 degrees affected shoulder flexion PROM to demonstrate progression to functional AROM.  2.Patient will demonstrate 155 degrees affected shoulder abduction PROM to demonstrate progression to functional AROM.   3.Patient will demonstrate 4/5 strength of affected shoulder flexion to increase ease with reaching overhead.     Long Term Goals: 18 weeks  1. .Patient will demonstrate 160 degrees affected shoulder flexion and abduction AROM to demonstrate progression to functional AROM  2.Increase strength to >/= 4+/5 in shoulder flexion and abduction to increase tolerance for ADL and work activities.  3. Pt goal: To resume reaching and carrying without pain.    Plan     Progress resisted strengthening    Cuco Guidry, PT

## 2018-11-01 ENCOUNTER — CLINICAL SUPPORT (OUTPATIENT)
Dept: REHABILITATION | Facility: HOSPITAL | Age: 53
End: 2018-11-01
Payer: COMMERCIAL

## 2018-11-01 DIAGNOSIS — M25.511 RIGHT SHOULDER PAIN, UNSPECIFIED CHRONICITY: ICD-10-CM

## 2018-11-01 PROCEDURE — 97140 MANUAL THERAPY 1/> REGIONS: CPT

## 2018-11-01 PROCEDURE — 97110 THERAPEUTIC EXERCISES: CPT

## 2018-11-01 NOTE — PROGRESS NOTES
Physical Therapy Daily Treatment Note     Name: Erick Espinosa  Clinic Number: [id number]    Therapy Diagnosis:   Encounter Diagnosis   Name Primary?    Right shoulder pain, unspecified chronicity      Physician: Jonathan Solis MD  Physician Orders: PT Eval and Treat  Medical Diagnosis: Right shoulder pain  Evaluation Date: 10/22/2018  Authorization Period Expiration: 12/31/2018  Plan of Care Certification Period: 2/25/2019  Date of Surgery: NA  Precautions: Standard  Visit Date: 11/1/2018  Visit # / Visits authorized: 2/20    Time In: 1000a  Time Out: 1115a   Total Billable Time: 75 minutes    Subjective      Pt reports: he was compliant with home exercise program given last session.   Response to previous treatment: Patient states that he felt good for a day after last session, feels more pain after installing lightbulbs last night     Pain: 2/10  Location: right shoulder      Objective     Erick received therapeutic exercises to develop strength, ROM and posture for 40 minutes including:    Pulleys - 3' ea  Prone row/ext/mid trap/low trap - 3 x 10, 2#  IR/ER 3 x 10 BTB  Row 3 x 10 BTB   Ext. 3 x 10 BTB  Bent over row 3 x 10 BTB      Erick received the following manual therapy techniques for 15 minutes, including:  PROM right shoulder all planes    Home Exercises Provided and Patient Education Provided     Education provided:   Continue current HEP    Written Home Exercises Provided: Continue with current HEP  Exercises were reviewed and Erick was able to demonstrate them prior to the end of the session.      Pt received a written copy of exercises to perform at home.   See EMR under patient instructions for exercises given.     Erick demonstrated good  understanding of the education provided.     Assessment     The patient has increased pain with posterior joint mobs and IR stretching  Erick is progressing well towards his goals.   Pt prognosis is Excellent.     Pt will  continue to benefit from skilled outpatient physical therapy to address the deficits listed in the problem list box on initial evaluation, provide pt/family education and to maximize pt's level of independence in the home and community environment.     Pt's spiritual, cultural and educational needs considered and pt agreeable to plan of care and goals.    Anticipated barriers to physical therapy: None    Goals:   Short Term Goals:  4 weeks  1.Patient will demonstrate 155 degrees affected shoulder flexion PROM to demonstrate progression to functional AROM.  2.Patient will demonstrate 155 degrees affected shoulder abduction PROM to demonstrate progression to functional AROM.   3.Patient will demonstrate 4/5 strength of affected shoulder flexion to increase ease with reaching overhead.     Long Term Goals: 18 weeks  1. .Patient will demonstrate 160 degrees affected shoulder flexion and abduction AROM to demonstrate progression to functional AROM  2.Increase strength to >/= 4+/5 in shoulder flexion and abduction to increase tolerance for ADL and work activities.  3. Pt goal: To resume reaching and carrying without pain.    Plan     Progress resisted strengthening    Cuco Guidry, PT

## 2018-11-07 ENCOUNTER — CLINICAL SUPPORT (OUTPATIENT)
Dept: REHABILITATION | Facility: HOSPITAL | Age: 53
End: 2018-11-07
Payer: COMMERCIAL

## 2018-11-07 DIAGNOSIS — M25.511 RIGHT SHOULDER PAIN, UNSPECIFIED CHRONICITY: ICD-10-CM

## 2018-11-07 PROCEDURE — 97110 THERAPEUTIC EXERCISES: CPT

## 2018-11-07 PROCEDURE — 97140 MANUAL THERAPY 1/> REGIONS: CPT

## 2018-11-07 NOTE — PROGRESS NOTES
Physical Therapy Daily Treatment Note     Name: Erick Espinosa  Clinic Number: [id number]    Therapy Diagnosis:   Encounter Diagnosis   Name Primary?    Right shoulder pain, unspecified chronicity      Physician: Jonathan Solis MD  Physician Orders: PT Eval and Treat  Medical Diagnosis: Right shoulder pain  Evaluation Date: 10/22/2018  Authorization Period Expiration: 12/31/2018  Plan of Care Certification Period: 2/25/2019  Date of Surgery: NA  Precautions: Standard  Visit Date: 11/7/2018  Visit # / Visits authorized: 4/20    Time In: 1100a  Time Out: 1215p   Total Billable Time: 75 minutes    Subjective      Pt reports: he was compliant with home exercise program given last session.   Response to previous treatment: Patient states that he is feeling less pain and has been doing less lifting and carrying at home    Pain: 2/10  Location: right shoulder      Objective     Erick received therapeutic exercises to develop strength, ROM and posture for 40 minutes including:    Pulleys - 3' ea  Prone row/ext/mid trap/low trap - 3 x 10, 2#  IR/ER 3 x 10 BTB  Row 3 x 10 BTB   Ext. 3 x 10 BTB  Bent over row 3 x 10 BTB      Erick received the following manual therapy techniques for 15 minutes, including:  PROM right shoulder all planes    Home Exercises Provided and Patient Education Provided     Education provided:   Continue current HEP    Written Home Exercises Provided: Continue with current HEP  Exercises were reviewed and Erick was able to demonstrate them prior to the end of the session.      Pt received a written copy of exercises to perform at home.   See EMR under patient instructions for exercises given.     Erick demonstrated good  understanding of the education provided.     Assessment     The patient demonstrates improved symptoms with posterior cuff strengthening    Erick is progressing well towards his goals.   Pt prognosis is Excellent.     Pt will continue to benefit  from skilled outpatient physical therapy to address the deficits listed in the problem list box on initial evaluation, provide pt/family education and to maximize pt's level of independence in the home and community environment.     Pt's spiritual, cultural and educational needs considered and pt agreeable to plan of care and goals.    Anticipated barriers to physical therapy: None    Goals:   Short Term Goals:  4 weeks  1.Patient will demonstrate 155 degrees affected shoulder flexion PROM to demonstrate progression to functional AROM.  2.Patient will demonstrate 155 degrees affected shoulder abduction PROM to demonstrate progression to functional AROM.   3.Patient will demonstrate 4/5 strength of affected shoulder flexion to increase ease with reaching overhead.     Long Term Goals: 18 weeks  1. .Patient will demonstrate 160 degrees affected shoulder flexion and abduction AROM to demonstrate progression to functional AROM  2.Increase strength to >/= 4+/5 in shoulder flexion and abduction to increase tolerance for ADL and work activities.  3. Pt goal: To resume reaching and carrying without pain.    Plan     Progress resisted strengthening    Cuco Guidry, PT

## 2018-11-09 ENCOUNTER — CLINICAL SUPPORT (OUTPATIENT)
Dept: REHABILITATION | Facility: HOSPITAL | Age: 53
End: 2018-11-09
Payer: COMMERCIAL

## 2018-11-09 DIAGNOSIS — M25.511 RIGHT SHOULDER PAIN, UNSPECIFIED CHRONICITY: ICD-10-CM

## 2018-11-09 PROCEDURE — 97110 THERAPEUTIC EXERCISES: CPT

## 2018-11-09 PROCEDURE — 97140 MANUAL THERAPY 1/> REGIONS: CPT

## 2018-11-09 NOTE — PROGRESS NOTES
Physical Therapy Daily Treatment Note     Name: Erick Espinosa  Clinic Number: [id number]    Therapy Diagnosis:   Encounter Diagnosis   Name Primary?    Right shoulder pain, unspecified chronicity      Physician: Jonathan Solis MD  Physician Orders: PT Eval and Treat  Medical Diagnosis: Right shoulder pain  Evaluation Date: 10/22/2018  Authorization Period Expiration: 12/31/2018  Plan of Care Certification Period: 2/25/2019  Date of Surgery: NA  Precautions: Standard  Visit Date: 11/9/2018  Visit # / Visits authorized: 6/20    Time In: 910a  Time Out: 1015p   Total Billable Time: 65 minutes    Subjective      Pt reports: he was compliant with home exercise program given last session.   Response to previous treatment: Patient states that he feels better the day after therapy but pain returns later    Pain: 2/10  Location: right shoulder      Objective     Erick received therapeutic exercises to develop strength, ROM and posture for 45 minutes including:    Pulleys - 3' ea  Prone row/mid trap/low trap - 3 x 10, 2#  IR/ER 3 x 10 BTB  Row 3 x 10 BTB  W pull - 3 x 10, green  Reverse fly - 3 x 10, green   Ext. 3 x 10 BTB  Manually resisted row and extension - 3 x 20    Erick received the following manual therapy techniques for 15 minutes, including:  PROM right shoulder all planes    Home Exercises Provided and Patient Education Provided     Education provided:   Continue current HEP    Written Home Exercises Provided: Continue with current HEP  Exercises were reviewed and Erick was able to demonstrate them prior to the end of the session.      Pt received a written copy of exercises to perform at home.   See EMR under patient instructions for exercises given.     Erick demonstrated good  understanding of the education provided.     Assessment     The patient demonstrates posterior impingement with abduction AROM, improved with inferior and posterior glides    Erick is progressing  well towards his goals.   Pt prognosis is Excellent.     Pt will continue to benefit from skilled outpatient physical therapy to address the deficits listed in the problem list box on initial evaluation, provide pt/family education and to maximize pt's level of independence in the home and community environment.     Pt's spiritual, cultural and educational needs considered and pt agreeable to plan of care and goals.    Anticipated barriers to physical therapy: None    Goals:   Short Term Goals:  4 weeks  1.Patient will demonstrate 155 degrees affected shoulder flexion PROM to demonstrate progression to functional AROM.  2.Patient will demonstrate 155 degrees affected shoulder abduction PROM to demonstrate progression to functional AROM.   3.Patient will demonstrate 4/5 strength of affected shoulder flexion to increase ease with reaching overhead.     Long Term Goals: 18 weeks  1. .Patient will demonstrate 160 degrees affected shoulder flexion and abduction AROM to demonstrate progression to functional AROM  2.Increase strength to >/= 4+/5 in shoulder flexion and abduction to increase tolerance for ADL and work activities.  3. Pt goal: To resume reaching and carrying without pain.    Plan     Progress resisted strengthening    Cuco Guidry, PT

## 2018-11-13 ENCOUNTER — CLINICAL SUPPORT (OUTPATIENT)
Dept: REHABILITATION | Facility: HOSPITAL | Age: 53
End: 2018-11-13
Payer: COMMERCIAL

## 2018-11-13 DIAGNOSIS — M25.511 RIGHT SHOULDER PAIN, UNSPECIFIED CHRONICITY: ICD-10-CM

## 2018-11-13 PROCEDURE — 97110 THERAPEUTIC EXERCISES: CPT

## 2018-11-13 PROCEDURE — 97140 MANUAL THERAPY 1/> REGIONS: CPT

## 2018-11-13 NOTE — PROGRESS NOTES
Physical Therapy Daily Treatment Note     Name: Erick Espinosa  Clinic Number: [id number]    Therapy Diagnosis:   Encounter Diagnosis   Name Primary?    Right shoulder pain, unspecified chronicity      Physician: Jonathan Solis MD  Physician Orders: PT Eval and Treat  Medical Diagnosis: Right shoulder pain  Evaluation Date: 10/22/2018  Authorization Period Expiration: 12/31/2018  Plan of Care Certification Period: 2/25/2019  Date of Surgery: NA  Precautions: Standard  Visit Date: 11/13/2018  Visit # / Visits authorized: 7/20    Time In: 1110a  Time Out: 1215p   Total Billable Time: 65 minutes    Subjective      Pt reports: he was compliant with home exercise program given last session.   Response to previous treatment: Patient states that he is having less pain with letting his arm hang at his side.    Pain: 2/10  Location: right shoulder      Objective     Erick received therapeutic exercises to develop strength, ROM and posture for 45 minutes including:    Pulleys - 3' ea  Prone row/mid trap/low trap - 3 x 10, 2#  IR/ER 3 x 10 BTB  Row 3 x 10 BTB  W pull - 3 x 10, green  Reverse fly - 3 x 10, 10  Ext. 3 x 10 BTB  Manually resisted row and extension - 3 x 20    Erick received the following manual therapy techniques for 15 minutes, including:  PROM right shoulder all planes    Home Exercises Provided and Patient Education Provided     Education provided:   Continue current HEP    Written Home Exercises Provided: Continue with current HEP  Exercises were reviewed and Erick was able to demonstrate them prior to the end of the session.      Pt received a written copy of exercises to perform at home.   See EMR under patient instructions for exercises given.     Erick demonstrated good  understanding of the education provided.     Assessment     The patient has improved shoulder abduction range and able to perform resisted horizontal abduction with less pain    Erick is progressing  well towards his goals.   Pt prognosis is Excellent.     Pt will continue to benefit from skilled outpatient physical therapy to address the deficits listed in the problem list box on initial evaluation, provide pt/family education and to maximize pt's level of independence in the home and community environment.     Pt's spiritual, cultural and educational needs considered and pt agreeable to plan of care and goals.    Anticipated barriers to physical therapy: None    Goals:   Short Term Goals:  4 weeks  1.Patient will demonstrate 155 degrees affected shoulder flexion PROM to demonstrate progression to functional AROM.  2.Patient will demonstrate 155 degrees affected shoulder abduction PROM to demonstrate progression to functional AROM.   3.Patient will demonstrate 4/5 strength of affected shoulder flexion to increase ease with reaching overhead.     Long Term Goals: 18 weeks  1. .Patient will demonstrate 160 degrees affected shoulder flexion and abduction AROM to demonstrate progression to functional AROM  2.Increase strength to >/= 4+/5 in shoulder flexion and abduction to increase tolerance for ADL and work activities.  3. Pt goal: To resume reaching and carrying without pain.    Plan     Progress resisted strengthening    Cuco Guidry, PT

## 2018-11-16 ENCOUNTER — CLINICAL SUPPORT (OUTPATIENT)
Dept: REHABILITATION | Facility: HOSPITAL | Age: 53
End: 2018-11-16
Payer: COMMERCIAL

## 2018-11-16 DIAGNOSIS — M25.511 RIGHT SHOULDER PAIN, UNSPECIFIED CHRONICITY: ICD-10-CM

## 2018-11-16 PROCEDURE — 97110 THERAPEUTIC EXERCISES: CPT

## 2018-11-16 PROCEDURE — 97140 MANUAL THERAPY 1/> REGIONS: CPT

## 2018-11-16 NOTE — PROGRESS NOTES
Physical Therapy Daily Treatment Note     Name: Erick Espinosa  Clinic Number: [id number]    Therapy Diagnosis:   Encounter Diagnosis   Name Primary?    Right shoulder pain, unspecified chronicity      Physician: Jonathan Solis MD  Physician Orders: PT Eval and Treat  Medical Diagnosis: Right shoulder pain  Evaluation Date: 10/22/2018  Authorization Period Expiration: 12/31/2018  Plan of Care Certification Period: 2/25/2019  Date of Surgery: NA  Precautions: Standard  Visit Date: 11/16/2018  Visit # / Visits authorized: 8/20    Time In: 1110a  Time Out: 1215p   Total Billable Time: 65 minutes    Subjective      Pt reports: he was compliant with home exercise program given last session.   Response to previous treatment: Patient states that he is feeling increased strength and decreased pain at rest    Pain: 2/10  Location: right shoulder      Objective     Erick received therapeutic exercises to develop strength, ROM and posture for 45 minutes including:    Pulleys - 3' ea  Prone row/mid trap/low trap - 3 x 10, 2#  IR/ER 3 x 10 BTB  Row 3 x 10 BTB  W pull - 3 x 10, green  Reverse fly - 3 x 10, 10  Ext. 3 x 10 BTB  Manually resisted row and extension - 3 x 20    Erick received the following manual therapy techniques for 15 minutes, including:  PROM right shoulder all planes    Home Exercises Provided and Patient Education Provided     Education provided:   Continue current HEP    Written Home Exercises Provided: Continue with current HEP  Exercises were reviewed and Erick was able to demonstrate them prior to the end of the session.      Pt received a written copy of exercises to perform at home.   See EMR under patient instructions for exercises given.     Erick demonstrated good  understanding of the education provided.     Assessment     The patient demonstrates improvement in shoulder ER AROM    Erick is progressing well towards his goals.   Pt prognosis is Excellent.     Pt  will continue to benefit from skilled outpatient physical therapy to address the deficits listed in the problem list box on initial evaluation, provide pt/family education and to maximize pt's level of independence in the home and community environment.     Pt's spiritual, cultural and educational needs considered and pt agreeable to plan of care and goals.    Anticipated barriers to physical therapy: None    Goals:   Short Term Goals:  4 weeks  1.Patient will demonstrate 155 degrees affected shoulder flexion PROM to demonstrate progression to functional AROM.  2.Patient will demonstrate 155 degrees affected shoulder abduction PROM to demonstrate progression to functional AROM.   3.Patient will demonstrate 4/5 strength of affected shoulder flexion to increase ease with reaching overhead.     Long Term Goals: 18 weeks  1. .Patient will demonstrate 160 degrees affected shoulder flexion and abduction AROM to demonstrate progression to functional AROM  2.Increase strength to >/= 4+/5 in shoulder flexion and abduction to increase tolerance for ADL and work activities.  3. Pt goal: To resume reaching and carrying without pain.    Plan     Progress resisted strengthening    Cuco Guidry, PT

## 2018-11-20 ENCOUNTER — CLINICAL SUPPORT (OUTPATIENT)
Dept: REHABILITATION | Facility: HOSPITAL | Age: 53
End: 2018-11-20
Payer: COMMERCIAL

## 2018-11-20 DIAGNOSIS — M25.511 RIGHT SHOULDER PAIN, UNSPECIFIED CHRONICITY: ICD-10-CM

## 2018-11-20 PROCEDURE — 97140 MANUAL THERAPY 1/> REGIONS: CPT

## 2018-11-20 PROCEDURE — 97110 THERAPEUTIC EXERCISES: CPT

## 2018-11-20 NOTE — PROGRESS NOTES
Physical Therapy Daily Treatment Note     Name: Erick Espinosa  Clinic Number: [id number]    Therapy Diagnosis:   Encounter Diagnosis   Name Primary?    Right shoulder pain, unspecified chronicity      Physician: Jonathan Solis MD  Physician Orders: PT Eval and Treat  Medical Diagnosis: Right shoulder pain  Evaluation Date: 10/22/2018  Authorization Period Expiration: 12/31/2018  Plan of Care Certification Period: 2/25/2019  Date of Surgery: NA  Precautions: Standard  Visit Date: 11/20/2018  Visit # / Visits authorized: 9/20    Time In: 1100a  Time Out: 1215p   Total Billable Time: 65 minutes    Subjective      Pt reports: he was compliant with home exercise program given last session.   Response to previous treatment: Patient states that he is feeling better, just still some posterior ankle pain    Pain: 2/10  Location: right shoulder      Objective     Erick received therapeutic exercises to develop strength, ROM and posture for 45 minutes including:    Pulleys - 3' ea  ER/IR walkouts 2 x 10   Prone row/mid trap/low trap - 3 x 10, 2#  IR/ER 3 x 10 BTB  Row 3 x 10 BTB  W pull - 3 x 10, green  Reverse fly - 3 x 10, 10  Ext. 3 x 10 BTB      Erick received the following manual therapy techniques for 15 minutes, including:  PROM right shoulder all planes    Home Exercises Provided and Patient Education Provided     Education provided:   Continue current HEP    Written Home Exercises Provided: Continue with current HEP  Exercises were reviewed and Erick was able to demonstrate them prior to the end of the session.      Pt received a written copy of exercises to perform at home.   See EMR under patient instructions for exercises given.     Erick demonstrated good  understanding of the education provided.     Assessment     The patient demonstrates improvement in shoulder flexion strength    Erick is progressing well towards his goals.   Pt prognosis is Excellent.     Pt will continue  to benefit from skilled outpatient physical therapy to address the deficits listed in the problem list box on initial evaluation, provide pt/family education and to maximize pt's level of independence in the home and community environment.     Pt's spiritual, cultural and educational needs considered and pt agreeable to plan of care and goals.    Anticipated barriers to physical therapy: None    Goals:   Short Term Goals:  4 weeks  1.Patient will demonstrate 155 degrees affected shoulder flexion PROM to demonstrate progression to functional AROM.  2.Patient will demonstrate 155 degrees affected shoulder abduction PROM to demonstrate progression to functional AROM.   3.Patient will demonstrate 4/5 strength of affected shoulder flexion to increase ease with reaching overhead.     Long Term Goals: 18 weeks  1. .Patient will demonstrate 160 degrees affected shoulder flexion and abduction AROM to demonstrate progression to functional AROM  2.Increase strength to >/= 4+/5 in shoulder flexion and abduction to increase tolerance for ADL and work activities.  3. Pt goal: To resume reaching and carrying without pain.    Plan     Progress resisted strengthening    Cuco Guidry, PT

## 2018-11-27 ENCOUNTER — CLINICAL SUPPORT (OUTPATIENT)
Dept: REHABILITATION | Facility: HOSPITAL | Age: 53
End: 2018-11-27
Payer: COMMERCIAL

## 2018-11-27 DIAGNOSIS — M25.511 RIGHT SHOULDER PAIN, UNSPECIFIED CHRONICITY: ICD-10-CM

## 2018-11-27 PROCEDURE — 97110 THERAPEUTIC EXERCISES: CPT

## 2018-11-27 PROCEDURE — 97140 MANUAL THERAPY 1/> REGIONS: CPT

## 2018-11-27 NOTE — PROGRESS NOTES
Physical Therapy Daily Treatment Note     Name: Erick Espinosa  Clinic Number: [id number]    Therapy Diagnosis:   Encounter Diagnosis   Name Primary?    Right shoulder pain, unspecified chronicity      Physician: Jonathan Solis MD  Physician Orders: PT Eval and Treat  Medical Diagnosis: Right shoulder pain  Evaluation Date: 10/22/2018  Authorization Period Expiration: 12/31/2018  Plan of Care Certification Period: 2/25/2019  Date of Surgery: NA  Precautions: Standard  Visit Date: 11/27/2018  Visit # / Visits authorized: 9/20    Time In: 1100a  Time Out: 1215p   Total Billable Time: 65 minutes    Subjective      Pt reports: he was compliant with home exercise program given last session.   Response to previous treatment: Patient states that he is feeling like pain is not changing over the last 2 weeks    Pain: 2/10  Location: right shoulder      Objective     Erick received therapeutic exercises to develop strength, ROM and posture for 10 minutes including:    Pulleys - 3' ea    Erick received the following manual therapy techniques for 15 minutes, including:  PROM right shoulder all planes    Dry needling to posterior shoulder with stim for 8 minutes administered by Trent Gardner PT - no adverse reaction or complaints    Home Exercises Provided and Patient Education Provided     Education provided:   Continue current HEP    Written Home Exercises Provided: Continue with current HEP  Exercises were reviewed and Erick was able to demonstrate them prior to the end of the session.      Pt received a written copy of exercises to perform at home.   See EMR under patient instructions for exercises given.     Erick demonstrated good  understanding of the education provided.     Assessment     The patient still has posterior cuff weakness affecting static stance and sitting posture    Erick is progressing well towards his goals.   Pt prognosis is Excellent.     Pt will continue to benefit  from skilled outpatient physical therapy to address the deficits listed in the problem list box on initial evaluation, provide pt/family education and to maximize pt's level of independence in the home and community environment.     Pt's spiritual, cultural and educational needs considered and pt agreeable to plan of care and goals.    Anticipated barriers to physical therapy: None    Goals:   Short Term Goals:  4 weeks  1.Patient will demonstrate 155 degrees affected shoulder flexion PROM to demonstrate progression to functional AROM.  2.Patient will demonstrate 155 degrees affected shoulder abduction PROM to demonstrate progression to functional AROM.   3.Patient will demonstrate 4/5 strength of affected shoulder flexion to increase ease with reaching overhead.     Long Term Goals: 18 weeks  1. .Patient will demonstrate 160 degrees affected shoulder flexion and abduction AROM to demonstrate progression to functional AROM  2.Increase strength to >/= 4+/5 in shoulder flexion and abduction to increase tolerance for ADL and work activities.  3. Pt goal: To resume reaching and carrying without pain.    Plan     Progress resisted strengthening for posterior cuff    Cuco Guidry, PT

## 2018-11-30 ENCOUNTER — CLINICAL SUPPORT (OUTPATIENT)
Dept: REHABILITATION | Facility: HOSPITAL | Age: 53
End: 2018-11-30
Payer: COMMERCIAL

## 2018-11-30 DIAGNOSIS — M25.511 RIGHT SHOULDER PAIN, UNSPECIFIED CHRONICITY: ICD-10-CM

## 2018-11-30 PROCEDURE — 97110 THERAPEUTIC EXERCISES: CPT

## 2018-11-30 PROCEDURE — 97140 MANUAL THERAPY 1/> REGIONS: CPT

## 2018-11-30 NOTE — PROGRESS NOTES
Physical Therapy Daily Treatment Note     Name: Erick Espinosa  Clinic Number: [id number]    Therapy Diagnosis:   Encounter Diagnosis   Name Primary?    Right shoulder pain, unspecified chronicity      Physician: Jonathan Solis MD  Physician Orders: PT Eval and Treat  Medical Diagnosis: Right shoulder pain  Evaluation Date: 10/22/2018  Authorization Period Expiration: 12/31/2018  Plan of Care Certification Period: 2/25/2019  Date of Surgery: NA  Precautions: Standard  Visit Date: 11/30/2018  Visit # / Visits authorized: 10/20    Time In: 1000a  Time Out: 1055a   Total Billable Time: 65 minutes    Subjective      Pt reports: he was compliant with home exercise program given last session.   Response to previous treatment: Patient states that he is feeling worse than last visit with more intense pain    Pain: 2/10  Location: right shoulder      Objective     Erick received therapeutic exercises to develop strength, ROM and posture for 20 minutes including:    Pulleys - 3' ea  Propping in supine - 10'  Prone row and extension - burn  SL ER - burn    Erick received the following manual therapy techniques for 15 minutes, including:  PROM right shoulder all planes    Home Exercises Provided and Patient Education Provided     Education provided:   Continue current HEP    Written Home Exercises Provided: Continue with current HEP  Exercises were reviewed and Erick was able to demonstrate them prior to the end of the session.      Pt received a written copy of exercises to perform at home.   See EMR under patient instructions for exercises given.     Erick demonstrated good  understanding of the education provided.     Assessment     The patient demonstrates no change in pain with treatment since last session.  He does get some relief with propping of shoulder in scpation position when supine and instructed in new HEP with no resisted motion    Erick is progressing well towards his goals.    Pt prognosis is Excellent.     Pt will continue to benefit from skilled outpatient physical therapy to address the deficits listed in the problem list box on initial evaluation, provide pt/family education and to maximize pt's level of independence in the home and community environment.     Pt's spiritual, cultural and educational needs considered and pt agreeable to plan of care and goals.    Anticipated barriers to physical therapy: None    Goals:   Short Term Goals:  4 weeks  1.Patient will demonstrate 155 degrees affected shoulder flexion PROM to demonstrate progression to functional AROM.  2.Patient will demonstrate 155 degrees affected shoulder abduction PROM to demonstrate progression to functional AROM.   3.Patient will demonstrate 4/5 strength of affected shoulder flexion to increase ease with reaching overhead.     Long Term Goals: 18 weeks  1. .Patient will demonstrate 160 degrees affected shoulder flexion and abduction AROM to demonstrate progression to functional AROM  2.Increase strength to >/= 4+/5 in shoulder flexion and abduction to increase tolerance for ADL and work activities.  3. Pt goal: To resume reaching and carrying without pain.    Plan     Progress resisted strengthening for posterior cuff    Cuco Guidry, PT

## 2018-12-04 ENCOUNTER — CLINICAL SUPPORT (OUTPATIENT)
Dept: REHABILITATION | Facility: HOSPITAL | Age: 53
End: 2018-12-04
Payer: COMMERCIAL

## 2018-12-04 DIAGNOSIS — M25.511 RIGHT SHOULDER PAIN, UNSPECIFIED CHRONICITY: ICD-10-CM

## 2018-12-04 PROCEDURE — 97140 MANUAL THERAPY 1/> REGIONS: CPT

## 2018-12-04 PROCEDURE — 97110 THERAPEUTIC EXERCISES: CPT

## 2018-12-04 NOTE — PROGRESS NOTES
Physical Therapy Daily Treatment Note     Name: Erick Espinosa  Clinic Number: [id number]    Therapy Diagnosis:   Encounter Diagnosis   Name Primary?    Right shoulder pain, unspecified chronicity      Physician: Jonathan Solis MD  Physician Orders: PT Eval and Treat  Medical Diagnosis: Right shoulder pain  Evaluation Date: 10/22/2018  Authorization Period Expiration: 12/31/2018  Plan of Care Certification Period: 2/25/2019  Date of Surgery: NA  Precautions: Standard  Visit Date: 12/4/2018  Visit # / Visits authorized: 10/20    Time In: 1100a  Time Out: 1205p   Total Billable Time: 65 minutes    Subjective      Pt reports: he was compliant with home exercise program given last session.   Response to previous treatment: Patient states that he is feeling about the same pain or more    Pain: 2/10  Location: right shoulder      Objective     Erick received therapeutic exercises to develop strength, ROM and posture for 20 minutes including:    Pulleys - 3' ea  Propping in supine - 10'  Prone row and extension - burn  SL ER - burn    Erick received the following manual therapy techniques for 15 minutes, including:  PROM right shoulder all planes    Home Exercises Provided and Patient Education Provided     Education provided:   Continue current HEP    Written Home Exercises Provided: Continue with current HEP  Exercises were reviewed and Erick was able to demonstrate them prior to the end of the session.      Pt received a written copy of exercises to perform at home.   See EMR under patient instructions for exercises given.     Erick demonstrated good  understanding of the education provided.     Assessment     The patient demonstrates minimal change in symptoms since last visit, will be referred back to physician    Erick is progressing well towards his goals.   Pt prognosis is Excellent.     Pt will continue to benefit from skilled outpatient physical therapy to address the  deficits listed in the problem list box on initial evaluation, provide pt/family education and to maximize pt's level of independence in the home and community environment.     Pt's spiritual, cultural and educational needs considered and pt agreeable to plan of care and goals.    Anticipated barriers to physical therapy: None    Goals:   Short Term Goals:  4 weeks  1.Patient will demonstrate 155 degrees affected shoulder flexion PROM to demonstrate progression to functional AROM.  2.Patient will demonstrate 155 degrees affected shoulder abduction PROM to demonstrate progression to functional AROM.   3.Patient will demonstrate 4/5 strength of affected shoulder flexion to increase ease with reaching overhead.     Long Term Goals: 18 weeks  1. .Patient will demonstrate 160 degrees affected shoulder flexion and abduction AROM to demonstrate progression to functional AROM  2.Increase strength to >/= 4+/5 in shoulder flexion and abduction to increase tolerance for ADL and work activities.  3. Pt goal: To resume reaching and carrying without pain.    Plan     Progress resisted strengthening for posterior cuff    Cuco Guidry, PT

## 2018-12-05 ENCOUNTER — TELEPHONE (OUTPATIENT)
Dept: SPORTS MEDICINE | Facility: CLINIC | Age: 53
End: 2018-12-05

## 2018-12-05 DIAGNOSIS — M25.511 RIGHT SHOULDER PAIN, UNSPECIFIED CHRONICITY: Primary | ICD-10-CM

## 2018-12-06 ENCOUNTER — CLINICAL SUPPORT (OUTPATIENT)
Dept: REHABILITATION | Facility: HOSPITAL | Age: 53
End: 2018-12-06
Payer: COMMERCIAL

## 2018-12-06 DIAGNOSIS — M25.511 RIGHT SHOULDER PAIN, UNSPECIFIED CHRONICITY: ICD-10-CM

## 2018-12-06 PROCEDURE — 97140 MANUAL THERAPY 1/> REGIONS: CPT

## 2018-12-06 PROCEDURE — 97110 THERAPEUTIC EXERCISES: CPT

## 2018-12-06 NOTE — PROGRESS NOTES
Physical Therapy Daily Treatment Note     Name: Erick Espinosa  Clinic Number: [id number]    Therapy Diagnosis:   Encounter Diagnosis   Name Primary?    Right shoulder pain, unspecified chronicity      Physician: Jonathan Solis MD  Physician Orders: PT Eval and Treat  Medical Diagnosis: Right shoulder pain  Evaluation Date: 10/22/2018  Authorization Period Expiration: 12/31/2018  Plan of Care Certification Period: 2/25/2019  Date of Surgery: NA  Precautions: Standard  Visit Date: 12/6/2018  Visit # / Visits authorized: 11/20    Time In: 1100a  Time Out: 1145a   Total Billable Time: 45 minutes    Subjective      Pt reports: he was compliant with home exercise program given last session.   Response to previous treatment: Patient states that he has improved pain intensity since last visit    Pain: 2/10  Location: right shoulder      Objective     Erick received therapeutic exercises to develop strength, ROM and posture for 20 minutes including:    Pulleys - 3' ea  Propping in supine - 10'  Prone row and extension - burn  SL ER - burn    Erick received the following manual therapy techniques for 15 minutes, including:  PROM right shoulder all planes    Home Exercises Provided and Patient Education Provided     Education provided:   Continue current HEP    Written Home Exercises Provided: Continue with current HEP  Exercises were reviewed and Erick was able to demonstrate them prior to the end of the session.      Pt received a written copy of exercises to perform at home.   See EMR under patient instructions for exercises given.     Erick demonstrated good  understanding of the education provided.     Assessment     The patient demonstrates improvement in P/AROM and decreased discomfort with light resistance to ER    Erick is progressing well towards his goals.   Pt prognosis is Excellent.     Pt will continue to benefit from skilled outpatient physical therapy to address the  deficits listed in the problem list box on initial evaluation, provide pt/family education and to maximize pt's level of independence in the home and community environment.     Pt's spiritual, cultural and educational needs considered and pt agreeable to plan of care and goals.    Anticipated barriers to physical therapy: None    Goals:   Short Term Goals:  4 weeks  1.Patient will demonstrate 155 degrees affected shoulder flexion PROM to demonstrate progression to functional AROM.  2.Patient will demonstrate 155 degrees affected shoulder abduction PROM to demonstrate progression to functional AROM.   3.Patient will demonstrate 4/5 strength of affected shoulder flexion to increase ease with reaching overhead.     Long Term Goals: 18 weeks  1. .Patient will demonstrate 160 degrees affected shoulder flexion and abduction AROM to demonstrate progression to functional AROM  2.Increase strength to >/= 4+/5 in shoulder flexion and abduction to increase tolerance for ADL and work activities.  3. Pt goal: To resume reaching and carrying without pain.    Plan     Progress resisted strengthening for posterior cuff    Cuco Guidry, PT

## 2018-12-11 ENCOUNTER — CLINICAL SUPPORT (OUTPATIENT)
Dept: REHABILITATION | Facility: HOSPITAL | Age: 53
End: 2018-12-11
Payer: COMMERCIAL

## 2018-12-11 DIAGNOSIS — M25.511 RIGHT SHOULDER PAIN, UNSPECIFIED CHRONICITY: ICD-10-CM

## 2018-12-11 PROCEDURE — 97110 THERAPEUTIC EXERCISES: CPT

## 2018-12-11 PROCEDURE — 97140 MANUAL THERAPY 1/> REGIONS: CPT

## 2018-12-11 NOTE — PROGRESS NOTES
"                            Physical Therapy Daily Treatment Note     Name: Erick Espinosa  Clinic Number: [id number]    Therapy Diagnosis:   Encounter Diagnosis   Name Primary?    Right shoulder pain, unspecified chronicity      Physician: Jonathan Solis MD  Physician Orders: PT Eval and Treat  Medical Diagnosis: Right shoulder pain  Evaluation Date: 10/22/2018  Authorization Period Expiration: 12/31/2018  Plan of Care Certification Period: 2/25/2019  Date of Surgery: NA  Precautions: Standard  Visit Date: 12/11/2018  Visit # / Visits authorized: 12/20    Time In: 1100a  Time Out: 1145a   Total Billable Time: 45 minutes    Subjective      Pt reports: he was compliant with home exercise program given last session.   Response to previous treatment: Patient states that he is able to reach a little further behind his back    Pain: 2/10  Location: right shoulder      Objective     Erick received therapeutic exercises to develop strength, ROM and posture for 20 minutes including:    Pulleys - 3' ea  Propping in supine - 10'  Prone row and extension - burn, 2#  SL ER - burn, 2#  Reverse fly - 25x, orange  Er step out - 20x, green  Rows - 30x, blue, 5"    Erick received the following manual therapy techniques for 15 minutes, including:  PROM right shoulder all planes    Home Exercises Provided and Patient Education Provided     Education provided:   Continue current HEP    Written Home Exercises Provided: Continue with current HEP  Exercises were reviewed and Erick was able to demonstrate them prior to the end of the session.      Pt received a written copy of exercises to perform at home.   See EMR under patient instructions for exercises given.     Erick demonstrated good  understanding of the education provided.     Assessment     The patient demonstrates improvement in ROM IR and decreased pain with shoulder extension    Erick is progressing well towards his goals.   Pt prognosis is Excellent.     Pt will " continue to benefit from skilled outpatient physical therapy to address the deficits listed in the problem list box on initial evaluation, provide pt/family education and to maximize pt's level of independence in the home and community environment.     Pt's spiritual, cultural and educational needs considered and pt agreeable to plan of care and goals.    Anticipated barriers to physical therapy: None    Goals:   Short Term Goals:  4 weeks  1.Patient will demonstrate 155 degrees affected shoulder flexion PROM to demonstrate progression to functional AROM.  2.Patient will demonstrate 155 degrees affected shoulder abduction PROM to demonstrate progression to functional AROM.   3.Patient will demonstrate 4/5 strength of affected shoulder flexion to increase ease with reaching overhead.     Long Term Goals: 18 weeks  1. .Patient will demonstrate 160 degrees affected shoulder flexion and abduction AROM to demonstrate progression to functional AROM  2.Increase strength to >/= 4+/5 in shoulder flexion and abduction to increase tolerance for ADL and work activities.  3. Pt goal: To resume reaching and carrying without pain.    Plan     Progress resisted strengthening for posterior cuff    uCco Guidry, PT

## 2018-12-14 ENCOUNTER — CLINICAL SUPPORT (OUTPATIENT)
Dept: REHABILITATION | Facility: HOSPITAL | Age: 53
End: 2018-12-14
Payer: COMMERCIAL

## 2018-12-14 DIAGNOSIS — M25.511 RIGHT SHOULDER PAIN, UNSPECIFIED CHRONICITY: ICD-10-CM

## 2018-12-14 PROCEDURE — 97110 THERAPEUTIC EXERCISES: CPT

## 2018-12-14 PROCEDURE — 97140 MANUAL THERAPY 1/> REGIONS: CPT

## 2018-12-14 NOTE — PROGRESS NOTES
"                            Physical Therapy Daily Treatment Note     Name: Erick Espinosa  Clinic Number: [id number]    Therapy Diagnosis:   Encounter Diagnosis   Name Primary?    Right shoulder pain, unspecified chronicity      Physician: Jonathan Solis MD  Physician Orders: PT Eval and Treat  Medical Diagnosis: Right shoulder pain  Evaluation Date: 10/22/2018  Authorization Period Expiration: 12/31/2018  Plan of Care Certification Period: 2/25/2019  Date of Surgery: NA  Precautions: Standard  Visit Date: 12/14/2018  Visit # / Visits authorized: 13/20    Time In: 1000a  Time Out: 1100a   Total Billable Time: 60 minutes    Subjective      Pt reports: he was compliant with home exercise program given last session.   Response to previous treatment: Patient states that he is able to reach overhead with decreased pain    Pain: 2/10  Location: right shoulder      Objective     Erick received therapeutic exercises to develop strength, ROM and posture for 25 minutes including:    Pulleys - 3' ea  Propping in supine - 10'  Prone row and extension - burn, 2#  SL ER - burn, 2#  W pull - 3 x 10, green  Reverse fly - 25x, orange  Er step out - 20x, green  Rows - 30x, blue, 5", 3#    Erick received the following manual therapy techniques for 15 minutes, including:  PROM right shoulder all planes    Home Exercises Provided and Patient Education Provided     Education provided:   Continue current HEP    Written Home Exercises Provided: Continue with current HEP  Exercises were reviewed and Erick was able to demonstrate them prior to the end of the session.      Pt received a written copy of exercises to perform at home.   See EMR under patient instructions for exercises given.     Erick demonstrated good  understanding of the education provided.     Assessment     The patient is able to perform increased shoulder extension and strengthening through AROM with decreased pain    Erick is progressing well towards his goals. "   Pt prognosis is Excellent.     Pt will continue to benefit from skilled outpatient physical therapy to address the deficits listed in the problem list box on initial evaluation, provide pt/family education and to maximize pt's level of independence in the home and community environment.     Pt's spiritual, cultural and educational needs considered and pt agreeable to plan of care and goals.    Anticipated barriers to physical therapy: None    Goals:   Short Term Goals:  4 weeks  1.Patient will demonstrate 155 degrees affected shoulder flexion PROM to demonstrate progression to functional AROM.  2.Patient will demonstrate 155 degrees affected shoulder abduction PROM to demonstrate progression to functional AROM.   3.Patient will demonstrate 4/5 strength of affected shoulder flexion to increase ease with reaching overhead.     Long Term Goals: 18 weeks  1. .Patient will demonstrate 160 degrees affected shoulder flexion and abduction AROM to demonstrate progression to functional AROM  2.Increase strength to >/= 4+/5 in shoulder flexion and abduction to increase tolerance for ADL and work activities.  3. Pt goal: To resume reaching and carrying without pain.    Plan     Progress resisted strengthening for posterior cuff    Cuco Guidry, PT

## 2018-12-16 ENCOUNTER — HOSPITAL ENCOUNTER (OUTPATIENT)
Dept: RADIOLOGY | Facility: HOSPITAL | Age: 53
Discharge: HOME | End: 2018-12-16
Attending: STUDENT IN AN ORGANIZED HEALTH CARE EDUCATION/TRAINING PROGRAM
Payer: COMMERCIAL

## 2018-12-16 DIAGNOSIS — M25.511 RIGHT SHOULDER PAIN, UNSPECIFIED CHRONICITY: ICD-10-CM

## 2018-12-16 PROCEDURE — 73221 MRI JOINT UPR EXTREM W/O DYE: CPT | Mod: 26,RT,, | Performed by: RADIOLOGY

## 2018-12-16 PROCEDURE — 73221 MRI JOINT UPR EXTREM W/O DYE: CPT | Mod: TC,RT

## 2018-12-17 ENCOUNTER — TELEPHONE (OUTPATIENT)
Dept: SPORTS MEDICINE | Facility: CLINIC | Age: 53
End: 2018-12-17

## 2018-12-17 NOTE — TELEPHONE ENCOUNTER
Called patient to discuss MRI results. Shoulder has not improved much with PT.    By our read fraying and partial thickness tearing of supraspinatus, SLAP tear, biceps fraying. Given imaging findings and lack of improvement with PT, offered surgical treatment.    Plan:  Right Shoulder rotator cuff fraying and SLAP tear  Plan for OR on 12/27/18 versus 12/28/18  Plan for medical clearance prior given diabetes and BMI    All questions were answered, pt will contact us for questions or concerns in the interim.  Had thorough discussion of non-operative vs operative intervention, and risks and benefits of both.     We have discussed the surgery and recovery of arthroscopic shoulder surgery. he understands that there may be limited mobility up to several weeks after surgery depending on procedures that are performed at the time of surgery.    The spectrum of treatment options were discussed with the patient, including nonoperative and operative options.  After thorough discussion, the patient has elected to undergo surgical treatment to include:    Right    a. Shoulder arthroscopic rotator cuff debridement versus repair   b. Shoulder arthroscopic SAD   c. Shoulder arthroscopic extensive debridement   d. Shoulder arthroscopic biceps tenodesis (vs. subpect tenodesis)   e. Shoulder arthroscopic possible microfracture   f. Shoulder arthroscopic possible lysis of adhesions      The details of the surgical procedure were explained, including the location of probable incisions and a description of likely hardware and/or grafts to be used.  The patient understands the likely convalescence after surgery.  Also, we have thoroughly discussed the risks, benefits and alternatives to surgery, including, but not limited to, the risk of infection, joint stiffness, blood clot (including DVT and/or pulmonary embolus), neurologic and vascular injury.  It was explained that, if tissue has been repaired or reconstructed, there is a chance of  failure, which may require further management.  Consent form for surgery is signed and in chart.    These risks include but are not limited to: bleeding, infection, scarring, re-tear of repair, irreparability of the tear, damage to neurovascular structures, damage to cartilage, stiffness, blood clots, pulmonary embolism, swelling, compartment syndrome, need for further surgery, and the risks of anesthesia. She verbalized her understanding of these risks and wished to proceed with surgery.   Time was spent face-to-face with the patient during this encounter on counseling about treatment options including surgery and coordination of her care for preoperative visits, surgery and post-operative rehab.

## 2018-12-18 DIAGNOSIS — S43.431A SUPERIOR GLENOID LABRUM LESION OF RIGHT SHOULDER, INITIAL ENCOUNTER: ICD-10-CM

## 2018-12-18 DIAGNOSIS — M75.101 NONTRAUMATIC TEAR OF RIGHT ROTATOR CUFF: Primary | ICD-10-CM

## 2018-12-18 DIAGNOSIS — M75.21 BICEPS TENDINITIS OF RIGHT UPPER EXTREMITY: ICD-10-CM

## 2018-12-19 ENCOUNTER — OFFICE VISIT (OUTPATIENT)
Dept: SPORTS MEDICINE | Facility: CLINIC | Age: 53
End: 2018-12-19
Payer: COMMERCIAL

## 2018-12-19 ENCOUNTER — TELEPHONE (OUTPATIENT)
Dept: SPORTS MEDICINE | Facility: CLINIC | Age: 53
End: 2018-12-19

## 2018-12-19 VITALS
BODY MASS INDEX: 40.09 KG/M2 | HEIGHT: 70 IN | DIASTOLIC BLOOD PRESSURE: 75 MMHG | HEART RATE: 93 BPM | SYSTOLIC BLOOD PRESSURE: 130 MMHG | WEIGHT: 280 LBS

## 2018-12-19 DIAGNOSIS — M75.101 NON-TRAUMATIC ROTATOR CUFF TEAR, RIGHT: Primary | ICD-10-CM

## 2018-12-19 DIAGNOSIS — M75.21 BICEPS TENDONITIS, RIGHT: ICD-10-CM

## 2018-12-19 DIAGNOSIS — S43.431D SUPERIOR GLENOID LABRUM LESION OF RIGHT SHOULDER, SUBSEQUENT ENCOUNTER: ICD-10-CM

## 2018-12-19 PROCEDURE — 99999 PR PBB SHADOW E&M-EST. PATIENT-LVL V: CPT | Mod: PBBFAC,,, | Performed by: PHYSICIAN ASSISTANT

## 2018-12-19 PROCEDURE — 99499 UNLISTED E&M SERVICE: CPT | Mod: S$GLB,,, | Performed by: PHYSICIAN ASSISTANT

## 2018-12-19 RX ORDER — PROMETHAZINE HYDROCHLORIDE 25 MG/1
25 TABLET ORAL EVERY 6 HOURS PRN
Qty: 30 TABLET | Refills: 0 | Status: SHIPPED | OUTPATIENT
Start: 2018-12-19 | End: 2019-01-18

## 2018-12-19 RX ORDER — OXYCODONE AND ACETAMINOPHEN 10; 325 MG/1; MG/1
1 TABLET ORAL EVERY 6 HOURS PRN
Qty: 28 TABLET | Refills: 0 | Status: SHIPPED | OUTPATIENT
Start: 2018-12-19 | End: 2019-01-03 | Stop reason: SDUPTHER

## 2018-12-19 RX ORDER — ASPIRIN 325 MG
325 TABLET, DELAYED RELEASE (ENTERIC COATED) ORAL 2 TIMES DAILY
Qty: 84 TABLET | Refills: 0 | Status: SHIPPED | OUTPATIENT
Start: 2018-12-19 | End: 2019-01-30

## 2018-12-19 RX ORDER — TRAMADOL HYDROCHLORIDE 50 MG/1
50 TABLET ORAL
Qty: 40 TABLET | Refills: 0 | Status: SHIPPED | OUTPATIENT
Start: 2018-12-19 | End: 2019-01-14 | Stop reason: SDUPTHER

## 2018-12-19 NOTE — H&P
Erick Espinosa  is here for a completion of his perioperative paperwork. he  Is scheduled to undergo Right               a. Shoulder arthroscopic rotator cuff debridement versus repair              b. Shoulder arthroscopic SAD              c. Shoulder arthroscopic extensive debridement              d. Shoulder arthroscopic biceps tenodesis (vs. subpect tenodesis)              e. Shoulder arthroscopic possible microfracture              f. Shoulder arthroscopic possible lysis of adhesions on 12/28/18.      He is a healthy individual and does need clearance for this procedure. Pending clearance by Dr. Rodríguez, PCP, at [facility], [phone].    PAST MEDICAL HISTORY:   Past Medical History:   Diagnosis Date    Diabetes mellitus     Diabetes mellitus type II     High triglycerides     Hypertension     takes for kidneys    Kidney stone     Urinary tract infection      PAST SURGICAL HISTORY:   Past Surgical History:   Procedure Laterality Date    CYSTOSCOPY WITH RETROGRADE PYELOGRAM Left 8/1/2014    [facility]    CYSTOSCOPY WITH STENT PLACEMENT Left 8/20/2014    [facility]    CYSTOSCOPY WITH STENT PLACEMENT Left 8/1/2014    [facility]    EXTRACORPOREAL SHOCK WAVE LITHOTRIPSY      EXTRACTION-STONE-URETEROSCOPY Left 8/20/2014    [facility]    LITHOTRIPSY      REMOVAL-STENT-URETERAL Left 10/8/2014    [facility]     FAMILY HISTORY:   Family History   Problem Relation Age of Onset    Diabetes Mother     Heart attack Mother     Heart attack Father     Prostate cancer Neg Hx     Kidney disease Neg Hx      SOCIAL HISTORY:   Social History     Socioeconomic History    Marital status:      Spouse name: Not on file    Number of children: Not on file    Years of education: Not on file    Highest education level: Not on file  "  Social Needs    Financial resource strain: Not on file    Food insecurity - worry: Not on file    Food insecurity - inability: Not on file    Transportation needs - medical: Not on file    Transportation needs - non-medical: Not on file   Occupational History    Not on file   Tobacco Use    Smoking status: Former Smoker     Types: Cigarettes     Last attempt to quit: 2009     Years since quittin.9    Smokeless tobacco: Never Used   Substance and Sexual Activity    Alcohol use: No    Drug use: No    Sexual activity: Yes     Partners: Female   Other Topics Concern    Not on file   Social History Narrative    Not on file       MEDICATIONS:   Current Outpatient Medications:     allopurinol (ZYLOPRIM) 300 MG tablet, Take 1 tablet (300 mg total) by mouth once daily., Disp: 90 tablet, Rfl: 3    amitriptyline (ELAVIL) 10 MG tablet, TK 1 T PO QHS MAY INCREASE TO 2 TS PO QHS AFTER 5 DAYS, Disp: , Rfl: 2    atorvastatin (LIPITOR) 20 MG tablet, atorvastatin 20 mg tablet, Disp: , Rfl:     atorvastatin (LIPITOR) 40 MG tablet, atorvastatin 40 mg tablet, Disp: , Rfl:     azithromycin (Z-JALYN) 250 MG tablet, , Disp: , Rfl:     BD INSULIN PEN NEEDLE UF MINI 31 x 3/16 " Ndle, , Disp: , Rfl:     benzonatate (TESSALON) 200 MG capsule, TK 1 C PO TID PRF COUGH, Disp: , Rfl: 0    canagliflozin (INVOKANA) 100 mg Tab, Invokana 100 mg tablet, Disp: , Rfl:     canagliflozin (INVOKANA) 300 mg Tab tablet, Invokana 300 mg tablet, Disp: , Rfl:     ciprofloxacin HCl (CIPRO) 500 MG tablet, TK 1 T PO  BID, Disp: , Rfl: 0    diazePAM (VALIUM) 5 MG tablet, Take 1 tablet by mouth as needed., Disp: , Rfl: 2    ERGOCALCIFEROL, VITAMIN D2, (VITAMIN D ORAL), Take 1 tablet by mouth once daily., Disp: , Rfl:     fenofibrate (TRICOR) 145 MG tablet, fenofibrate nanocrystallized 145 mg tablet, Disp: , Rfl:     gemfibrozil (LOPID) 600 MG tablet, Take 1 tablet by mouth once daily., Disp: , Rfl:     glyBURIDE-metformin 5-500 mg " "(GLUCOVANCE) 5-500 mg Tab, Take 2 tablets by mouth 2 (two) times daily with meals., Disp: 360 tablet, Rfl: 3    glyBURIDE-metformin 5-500 mg (GLUCOVANCE) 5-500 mg Tab, glyburide 5 mg-metformin 500 mg tablet, Disp: , Rfl:     ibuprofen (ADVIL,MOTRIN) 800 MG tablet, Take 1 tablet by mouth daily as needed., Disp: , Rfl: 0    INVOKANA 100 mg Tab, Take 1 tablet by mouth once daily., Disp: , Rfl: 1    levocetirizine (XYZAL) 5 MG tablet, TK 1 T PO QD, Disp: , Rfl: 0    linagliptin (TRADJENTA) 5 mg Tab tablet, Tradjenta 5 mg tablet, Disp: , Rfl:     LYRICA 75 mg capsule, TK 1 C PO BID, Disp: , Rfl: 0    mv with min-FA-lycopene-ginkgo (ONE-A-DAY MEN'S 50+ ADVANTAGE) 400-300-120 mcg-mcg-mg Tab, Take 1 tablet by mouth once daily., Disp: , Rfl:     NOVOFINE 32 32 x 1/4 " Ndle, , Disp: , Rfl:     oxyCODONE-acetaminophen (PERCOCET) 5-325 mg per tablet, TK 1 T PO BID PRN P, Disp: , Rfl: 0    PENNSAID 20 mg/gram /actuation(2 %) sopm, APPLY 2 PUMPS TO THE AFFECTED KNEE(S) TWICE DAILY, Disp: , Rfl: 3    potassium citrate (UROCIT-K) 10 mEq (1,080 mg) TbSR, 1 po bid, Disp: 180 tablet, Rfl: 3    promethazine-dextromethorphan (PROMETHAZINE-DM) 6.25-15 mg/5 mL Syrp, , Disp: , Rfl: 0    ramipril (ALTACE) 5 MG capsule, , Disp: , Rfl:     ramipril (ALTACE) 5 MG capsule, ramipril 5 mg capsule, Disp: , Rfl:     sildenafil (REVATIO) 20 mg Tab, Take 1 tablet by mouth 1 hour prior to sex, increased stepwise as needed  up to max 5 tablets by mouth 1 hour prior to sex, Disp: 20 tablet, Rfl: 11    TRULICITY 0.75 mg/0.5 mL PnIj, INJECT 0.75 MG ONCE A WEEK, Disp: , Rfl: 5    zolpidem (AMBIEN) 10 mg Tab, Take 1 tablet by mouth nightly as needed., Disp: , Rfl: 3  ALLERGIES:   Review of patient's allergies indicates:   Allergen Reactions    Sulfa (sulfonamide antibiotics)        VITAL SIGNS: /75   Pulse 93   Ht 5' 10" (1.778 m)   Wt 127 kg (280 lb)   BMI 40.18 kg/m²      Risks, indications and benefits of the surgical " procedure were discussed with the patient. All questions with regard to surgery, rehab, expected return to functional activities, activities of daily living and recreational endeavors were answered to his satisfaction.    It was explained to the patient that there may be an increase in surgical risks if the patient has certain co-morbidities such as but not limited to: Obesity, Cardiovascular issues (CHF, CAD, Arrhythmias), chronic pulmonary issues, previous or current neurovascular/neurological issues, previous strokes, diabetes mellitus, previous wound healing issues, previous wound or skin infections, PVD, clotting disorders, if the patient uses chronic steroids, if the patient takes or has immune compromising medications or diseases, or has previously or currently used tobacco products.     The patient verbalized that he/she does not have any additional clotting, bleeding, or blood disorders, other than what is list in her chart on today's review.     Then a brief history and physical exam were performed.    Review of Systems   Constitution: Negative. Negative for chills, fever and night sweats.   HENT: Negative for congestion and headaches.    Eyes: Negative for blurred vision, left vision loss and right vision loss.   Cardiovascular: Negative for chest pain and syncope.   Respiratory: Negative for cough and shortness of breath.    Endocrine: Negative for polydipsia, polyphagia and polyuria.   Hematologic/Lymphatic: Negative for bleeding problem. Does not bruise/bleed easily.   Skin: Negative for dry skin, itching and rash.   Musculoskeletal: Negative for falls and muscle weakness.   Gastrointestinal: Negative for abdominal pain and bowel incontinence.   Genitourinary: Negative for bladder incontinence and nocturia.   Neurological: Negative for disturbances in coordination, loss of balance and seizures.   Psychiatric/Behavioral: Negative for depression. The patient does not have insomnia.     Allergic/Immunologic: Negative for hives and persistent infections.     PHYSICAL EXAM:  GEN: A&Ox3, WD WN NAD  HEENT: WNL  CHEST: CTAB, no W/R/R  HEART: RRR, no M/R/G  ABD: Soft, NT ND, BS x4 QUADS  MS; See Epic  NEURO: CN II-XII intact       The surgical consent was then reviewed with the patient, who agreed with all the contents of the consent form and it was signed. he was then given the [facility] surgery packet to bring with him to [facility] for the anesthesia portion of his perioperative paperwork.   For all physicians except for Dr. Navarro, we will email and possibly fax the consent forms and booking sheets to [facility] pre-[facility]mit.    The patient was given the opportunity to ask questions about the surgical plan and consent form, and once no other questions were asked, I proceeded with the pre-op appointment.    PHYSICAL THERAPY:  He was also instructed regarding physical therapy and will begin when Dr. Hall says to start at [facility]. Order faxed.    POST OP CARE:instructions were reviewed including care of the wound and dressing after surgery and when he can shower.     PAIN MANAGEMENT: Erick Espinosa was also given his pain management regime, which includes the TENS unit given to him by patt along with the education required for its use. He was also instructed regarding the Polar ice unit that will be in place after surgery and his postoperative pain medications.     PAIN MEDICATION:  Percocet 10/325mg 1 po q 4-6 hours prn pain  Ultram 50 mg Take 1-2 p.o. q.6 hours p.r.n. breakthrough pain,   Phenergan 25 mg one p.o. q.6 hours p.r.n. nausea and vomiting.  ASA 325mg BID x 6 weeks post op    The patient was told that narcotic pain medications may make them drowsy and instructions were given to not sign legal documents, drive or operate heavy machinery, cars, or equipment while under the influence of narcotic medications. The patient was told and understands that narcotic pain medications  should only be used as needed to control pain and that other options of pain control include TENs unit and ice packs/unit.     As there were no other questions to be asked, he was given my business card along with Malgorzata Hall MD business card if he has any questions or concerns prior to surgery or in the postop period.     At the end of our encounter today, the patient was given the option and asked if they would like to see the surgeon regarding questions or concerns that they have about the consent form or the surgical procedure. The patient declined.

## 2018-12-19 NOTE — TELEPHONE ENCOUNTER
Spoke to patient about his return to work date. Patient stated as soon as he brings in his FMLA paperwork his return to work should not be before 2/28/19.

## 2018-12-19 NOTE — H&P (VIEW-ONLY)
Erick Espinosa  is here for a completion of his perioperative paperwork. he  Is scheduled to undergo Right               a. Shoulder arthroscopic rotator cuff debridement versus repair              b. Shoulder arthroscopic SAD              c. Shoulder arthroscopic extensive debridement              d. Shoulder arthroscopic biceps tenodesis (vs. subpect tenodesis)              e. Shoulder arthroscopic possible microfracture              f. Shoulder arthroscopic possible lysis of adhesions on 12/28/18.      He is a healthy individual and does need clearance for this procedure. Pending clearance by Dr. Rodríguez, PCP, at [facility], [phone].    PAST MEDICAL HISTORY:   Past Medical History:   Diagnosis Date    Diabetes mellitus     Diabetes mellitus type II     High triglycerides     Hypertension     takes for kidneys    Kidney stone     Urinary tract infection      PAST SURGICAL HISTORY:   Past Surgical History:   Procedure Laterality Date    CYSTOSCOPY WITH RETROGRADE PYELOGRAM Left 8/1/2014    [facility] OR    CYSTOSCOPY WITH STENT PLACEMENT Left 8/20/2014    [facility] OR    CYSTOSCOPY WITH STENT PLACEMENT Left 8/1/2014    [facility] OR    EXTRACORPOREAL SHOCK WAVE LITHOTRIPSY      EXTRACTION-STONE-URETEROSCOPY Left 8/20/2014    [facility] OR    LITHOTRIPSY      REMOVAL-STENT-URETERAL Left 10/8/2014    [facility] by [facility] at [facility] OR     FAMILY HISTORY:   Family History   Problem Relation Age of Onset    Diabetes Mother     Heart attack Mother     Heart attack Father     Prostate cancer Neg Hx     Kidney disease Neg Hx      SOCIAL HISTORY:   Social History     Socioeconomic History    Marital status:      Spouse name: Not on file    Number of children: Not on file    Years of education: Not on file    Highest education level: Not on file  "  Social Needs    Financial resource strain: Not on file    Food insecurity - worry: Not on file    Food insecurity - inability: Not on file    Transportation needs - medical: Not on file    Transportation needs - non-medical: Not on file   Occupational History    Not on file   Tobacco Use    Smoking status: Former Smoker     Types: Cigarettes     Last attempt to quit: 2009     Years since quittin.9    Smokeless tobacco: Never Used   Substance and Sexual Activity    Alcohol use: No    Drug use: No    Sexual activity: Yes     Partners: Female   Other Topics Concern    Not on file   Social History Narrative    Not on file       MEDICATIONS:   Current Outpatient Medications:     allopurinol (ZYLOPRIM) 300 MG tablet, Take 1 tablet (300 mg total) by mouth once daily., Disp: 90 tablet, Rfl: 3    amitriptyline (ELAVIL) 10 MG tablet, TK 1 T PO QHS MAY INCREASE TO 2 TS PO QHS AFTER 5 DAYS, Disp: , Rfl: 2    atorvastatin (LIPITOR) 20 MG tablet, atorvastatin 20 mg tablet, Disp: , Rfl:     atorvastatin (LIPITOR) 40 MG tablet, atorvastatin 40 mg tablet, Disp: , Rfl:     azithromycin (Z-JALYN) 250 MG tablet, , Disp: , Rfl:     BD INSULIN PEN NEEDLE UF MINI 31 x 3/16 " Ndle, , Disp: , Rfl:     benzonatate (TESSALON) 200 MG capsule, TK 1 C PO TID PRF COUGH, Disp: , Rfl: 0    canagliflozin (INVOKANA) 100 mg Tab, Invokana 100 mg tablet, Disp: , Rfl:     canagliflozin (INVOKANA) 300 mg Tab tablet, Invokana 300 mg tablet, Disp: , Rfl:     ciprofloxacin HCl (CIPRO) 500 MG tablet, TK 1 T PO  BID, Disp: , Rfl: 0    diazePAM (VALIUM) 5 MG tablet, Take 1 tablet by mouth as needed., Disp: , Rfl: 2    ERGOCALCIFEROL, VITAMIN D2, (VITAMIN D ORAL), Take 1 tablet by mouth once daily., Disp: , Rfl:     fenofibrate (TRICOR) 145 MG tablet, fenofibrate nanocrystallized 145 mg tablet, Disp: , Rfl:     gemfibrozil (LOPID) 600 MG tablet, Take 1 tablet by mouth once daily., Disp: , Rfl:     glyBURIDE-metformin 5-500 mg " "(GLUCOVANCE) 5-500 mg Tab, Take 2 tablets by mouth 2 (two) times daily with meals., Disp: 360 tablet, Rfl: 3    glyBURIDE-metformin 5-500 mg (GLUCOVANCE) 5-500 mg Tab, glyburide 5 mg-metformin 500 mg tablet, Disp: , Rfl:     ibuprofen (ADVIL,MOTRIN) 800 MG tablet, Take 1 tablet by mouth daily as needed., Disp: , Rfl: 0    INVOKANA 100 mg Tab, Take 1 tablet by mouth once daily., Disp: , Rfl: 1    levocetirizine (XYZAL) 5 MG tablet, TK 1 T PO QD, Disp: , Rfl: 0    linagliptin (TRADJENTA) 5 mg Tab tablet, Tradjenta 5 mg tablet, Disp: , Rfl:     LYRICA 75 mg capsule, TK 1 C PO BID, Disp: , Rfl: 0    mv with min-FA-lycopene-ginkgo (ONE-A-DAY MEN'S 50+ ADVANTAGE) 400-300-120 mcg-mcg-mg Tab, Take 1 tablet by mouth once daily., Disp: , Rfl:     NOVOFINE 32 32 x 1/4 " Ndle, , Disp: , Rfl:     oxyCODONE-acetaminophen (PERCOCET) 5-325 mg per tablet, TK 1 T PO BID PRN P, Disp: , Rfl: 0    PENNSAID 20 mg/gram /actuation(2 %) sopm, APPLY 2 PUMPS TO THE AFFECTED KNEE(S) TWICE DAILY, Disp: , Rfl: 3    potassium citrate (UROCIT-K) 10 mEq (1,080 mg) TbSR, 1 po bid, Disp: 180 tablet, Rfl: 3    promethazine-dextromethorphan (PROMETHAZINE-DM) 6.25-15 mg/5 mL Syrp, , Disp: , Rfl: 0    ramipril (ALTACE) 5 MG capsule, , Disp: , Rfl:     ramipril (ALTACE) 5 MG capsule, ramipril 5 mg capsule, Disp: , Rfl:     sildenafil (REVATIO) 20 mg Tab, Take 1 tablet by mouth 1 hour prior to sex, increased stepwise as needed  up to max 5 tablets by mouth 1 hour prior to sex, Disp: 20 tablet, Rfl: 11    TRULICITY 0.75 mg/0.5 mL PnIj, INJECT 0.75 MG ONCE A WEEK, Disp: , Rfl: 5    zolpidem (AMBIEN) 10 mg Tab, Take 1 tablet by mouth nightly as needed., Disp: , Rfl: 3  ALLERGIES:   Review of patient's allergies indicates:   Allergen Reactions    Sulfa (sulfonamide antibiotics)        VITAL SIGNS: /75   Pulse 93   Ht 5' 10" (1.778 m)   Wt 127 kg (280 lb)   BMI 40.18 kg/m²      Risks, indications and benefits of the surgical " procedure were discussed with the patient. All questions with regard to surgery, rehab, expected return to functional activities, activities of daily living and recreational endeavors were answered to his satisfaction.    It was explained to the patient that there may be an increase in surgical risks if the patient has certain co-morbidities such as but not limited to: Obesity, Cardiovascular issues (CHF, CAD, Arrhythmias), chronic pulmonary issues, previous or current neurovascular/neurological issues, previous strokes, diabetes mellitus, previous wound healing issues, previous wound or skin infections, PVD, clotting disorders, if the patient uses chronic steroids, if the patient takes or has immune compromising medications or diseases, or has previously or currently used tobacco products.     The patient verbalized that he/she does not have any additional clotting, bleeding, or blood disorders, other than what is list in her chart on today's review.     Then a brief history and physical exam were performed.    Review of Systems   Constitution: Negative. Negative for chills, fever and night sweats.   HENT: Negative for congestion and headaches.    Eyes: Negative for blurred vision, left vision loss and right vision loss.   Cardiovascular: Negative for chest pain and syncope.   Respiratory: Negative for cough and shortness of breath.    Endocrine: Negative for polydipsia, polyphagia and polyuria.   Hematologic/Lymphatic: Negative for bleeding problem. Does not bruise/bleed easily.   Skin: Negative for dry skin, itching and rash.   Musculoskeletal: Negative for falls and muscle weakness.   Gastrointestinal: Negative for abdominal pain and bowel incontinence.   Genitourinary: Negative for bladder incontinence and nocturia.   Neurological: Negative for disturbances in coordination, loss of balance and seizures.   Psychiatric/Behavioral: Negative for depression. The patient does not have insomnia.     Allergic/Immunologic: Negative for hives and persistent infections.     PHYSICAL EXAM:  GEN: A&Ox3, WD WN NAD  HEENT: WNL  CHEST: CTAB, no W/R/R  HEART: RRR, no M/R/G  ABD: Soft, NT ND, BS x4 QUADS  MS; See Epic  NEURO: CN II-XII intact       The surgical consent was then reviewed with the patient, who agreed with all the contents of the consent form and it was signed. he was then given the [facility] surgery packet to bring with him to [facility] for the anesthesia portion of his perioperative paperwork.   For all physicians except for Dr. Navarro, we will email and possibly fax the consent forms and booking sheets to [facility] pre-admit.    The patient was given the opportunity to ask questions about the surgical plan and consent form, and once no other questions were asked, I proceeded with the pre-op appointment.    PHYSICAL THERAPY:  He was also instructed regarding physical therapy and will begin when Dr. Hall says to start at [facility]. Order faxed.    POST OP CARE:instructions were reviewed including care of the wound and dressing after surgery and when he can shower.     PAIN MANAGEMENT: Erick Espinosa was also given his pain management regime, which includes the TENS unit given to him by patt along with the education required for its use. He was also instructed regarding the Polar ice unit that will be in place after surgery and his postoperative pain medications.     PAIN MEDICATION:  Percocet 10/325mg 1 po q 4-6 hours prn pain  Ultram 50 mg Take 1-2 p.o. q.6 hours p.r.n. breakthrough pain,   Phenergan 25 mg one p.o. q.6 hours p.r.n. nausea and vomiting.  ASA 325mg BID x 6 weeks post op    The patient was told that narcotic pain medications may make them drowsy and instructions were given to not sign legal documents, drive or operate heavy machinery, cars, or equipment while under the influence of narcotic medications. The patient was told and understands that narcotic pain medications  should only be used as needed to control pain and that other options of pain control include TENs unit and ice packs/unit.     As there were no other questions to be asked, he was given my business card along with Malgorzata Hall MD business card if he has any questions or concerns prior to surgery or in the postop period.     At the end of our encounter today, the patient was given the option and asked if they would like to see the surgeon regarding questions or concerns that they have about the consent form or the surgical procedure. The patient declined.

## 2018-12-20 ENCOUNTER — TELEPHONE (OUTPATIENT)
Dept: SPORTS MEDICINE | Facility: CLINIC | Age: 53
End: 2018-12-20

## 2018-12-20 ENCOUNTER — ANESTHESIA EVENT (OUTPATIENT)
Dept: SURGERY | Facility: OTHER | Age: 53
End: 2018-12-20
Payer: COMMERCIAL

## 2018-12-20 ENCOUNTER — HOSPITAL ENCOUNTER (OUTPATIENT)
Dept: PREADMISSION TESTING | Facility: OTHER | Age: 53
Discharge: HOME | End: 2018-12-20
Attending: ORTHOPAEDIC SURGERY
Payer: COMMERCIAL

## 2018-12-20 VITALS
OXYGEN SATURATION: 98 % | WEIGHT: 280 LBS | BODY MASS INDEX: 40.09 KG/M2 | RESPIRATION RATE: 16 BRPM | DIASTOLIC BLOOD PRESSURE: 78 MMHG | HEART RATE: 70 BPM | SYSTOLIC BLOOD PRESSURE: 130 MMHG | TEMPERATURE: 98 F | HEIGHT: 70 IN

## 2018-12-20 DIAGNOSIS — Z01.818 PREOPERATIVE TESTING: ICD-10-CM

## 2018-12-20 LAB
ANION GAP SERPL CALC-SCNC: 11 MMOL/L
BASOPHILS # BLD AUTO: 0.01 K/UL
BASOPHILS NFR BLD: 0.2 %
BUN SERPL-MCNC: 21 MG/DL
CALCIUM SERPL-MCNC: 9.4 MG/DL
CHLORIDE SERPL-SCNC: 105 MMOL/L
CO2 SERPL-SCNC: 22 MMOL/L
CREAT SERPL-MCNC: 1.7 MG/DL
DIFFERENTIAL METHOD: NORMAL
EOSINOPHIL # BLD AUTO: 0.1 K/UL
EOSINOPHIL NFR BLD: 1.1 %
ERYTHROCYTE [DISTWIDTH] IN BLOOD BY AUTOMATED COUNT: 14.5 %
EST. GFR  (AFRICAN AMERICAN): 53 ML/MIN/1.73 M^2
EST. GFR  (NON AFRICAN AMERICAN): 46 ML/MIN/1.73 M^2
GLUCOSE SERPL-MCNC: 268 MG/DL
HCT VFR BLD AUTO: 46 %
HGB BLD-MCNC: 15.1 G/DL
LYMPHOCYTES # BLD AUTO: 1.2 K/UL
LYMPHOCYTES NFR BLD: 22.8 %
MCH RBC QN AUTO: 28.2 PG
MCHC RBC AUTO-ENTMCNC: 32.8 G/DL
MCV RBC AUTO: 86 FL
MONOCYTES # BLD AUTO: 0.3 K/UL
MONOCYTES NFR BLD: 5.9 %
NEUTROPHILS # BLD AUTO: 3.8 K/UL
NEUTROPHILS NFR BLD: 69.6 %
PLATELET # BLD AUTO: 196 K/UL
PMV BLD AUTO: 9.6 FL
POTASSIUM SERPL-SCNC: 4.1 MMOL/L
RBC # BLD AUTO: 5.35 M/UL
SODIUM SERPL-SCNC: 138 MMOL/L
WBC # BLD AUTO: 5.43 K/UL

## 2018-12-20 PROCEDURE — 36415 COLL VENOUS BLD VENIPUNCTURE: CPT

## 2018-12-20 PROCEDURE — 80048 BASIC METABOLIC PNL TOTAL CA: CPT

## 2018-12-20 PROCEDURE — 93010 ELECTROCARDIOGRAM REPORT: CPT | Mod: ,,, | Performed by: INTERNAL MEDICINE

## 2018-12-20 PROCEDURE — 93005 ELECTROCARDIOGRAM TRACING: CPT

## 2018-12-20 PROCEDURE — 85025 COMPLETE CBC W/AUTO DIFF WBC: CPT

## 2018-12-20 RX ORDER — OXYCODONE HYDROCHLORIDE 5 MG/1
5 TABLET ORAL ONCE AS NEEDED
Status: CANCELLED | OUTPATIENT
Start: 2018-12-20 | End: 2030-05-18

## 2018-12-20 RX ORDER — SODIUM CHLORIDE, SODIUM LACTATE, POTASSIUM CHLORIDE, CALCIUM CHLORIDE 600; 310; 30; 20 MG/100ML; MG/100ML; MG/100ML; MG/100ML
INJECTION, SOLUTION INTRAVENOUS CONTINUOUS
Status: CANCELLED | OUTPATIENT
Start: 2018-12-20

## 2018-12-20 RX ORDER — PREGABALIN 25 MG/1
75 CAPSULE ORAL ONCE
Status: CANCELLED | OUTPATIENT
Start: 2018-12-20 | End: 2018-12-20

## 2018-12-20 RX ORDER — LIDOCAINE HYDROCHLORIDE 10 MG/ML
0.5 INJECTION, SOLUTION EPIDURAL; INFILTRATION; INTRACAUDAL; PERINEURAL ONCE
Status: CANCELLED | OUTPATIENT
Start: 2018-12-20 | End: 2018-12-20

## 2018-12-20 RX ORDER — OXYCODONE HCL 10 MG/1
10 TABLET, FILM COATED, EXTENDED RELEASE ORAL ONCE
Status: CANCELLED | OUTPATIENT
Start: 2018-12-20 | End: 2018-12-20

## 2018-12-20 RX ORDER — FAMOTIDINE 20 MG/1
20 TABLET, FILM COATED ORAL
Status: CANCELLED | OUTPATIENT
Start: 2018-12-20 | End: 2018-12-20

## 2018-12-20 NOTE — TELEPHONE ENCOUNTER
----- Message from Carlos Perez sent at 12/20/2018  4:21 PM CST -----  Contact: Donald/Dr Daljit Rodríguez  Please call Donald at [phone] if any questions    This MD is still clearing the patient to have surgery  Dr Rodríguez recently sent additional updated medical information by fax today    Thank you

## 2018-12-20 NOTE — ANESTHESIA PREPROCEDURE EVALUATION
12/20/2018  Erick Espinosa is a 53 y.o., male here for ureteral stent removal.  Recent GA without problems.    Anesthesia Evaluation    I have reviewed the Patient Summary Reports.    I have reviewed the Nursing Notes.   I have reviewed the Medications.     Review of Systems  Anesthesia Hx:  No problems with previous Anesthesia  History of prior surgery of interest to airway management or planning: Previous anesthesia: General  Denies Personal Hx of Anesthesia complications.   Social:  Non-Smoker, No Alcohol Use    Cardiovascular:   Exercise tolerance: good Hypertension, well controlled ECG has been reviewed.    Pulmonary:  Pulmonary Normal    Renal/:   Chronic Renal Disease (see note at end), CRI renal calculi    Hepatic/GI:   PUD, GERD    Neurological:  Neurology Normal    Endocrine:   Diabetes    Psych:   Psychiatric History          Physical Exam  General:  Morbid Obesity    Airway/Jaw/Neck:  Airway Findings: Mouth Opening: Normal Tongue: Normal  General Airway Assessment: Adult  Mallampati: I  TM Distance: Normal, at least 6 cm  Jaw/Neck Findings:  Neck ROM: Normal ROM      Dental:  Dental Findings: In tact   Chest/Lungs:  Chest/Lungs Findings: Clear to auscultation, Normal Respiratory Rate     Heart/Vascular:  Heart Findings: Rate: Normal  Rhythm: Regular Rhythm        Mental Status:  Mental Status Findings:  Cooperative, Alert and Oriented         Anesthesia Plan  Type of Anesthesia, risks & benefits discussed:  Anesthesia Type:  general  Patient's Preference:   Intra-op Monitoring Plan: standard ASA monitors  Intra-op Monitoring Plan Comments:   Post Op Pain Control Plan: peripheral nerve block  Post Op Pain Control Plan Comments:   Induction:   IV  Beta Blocker:         Informed Consent: Patient understands risks and agrees with Anesthesia plan.  Questions answered. Anesthesia consent signed with  patient.  ASA Score: 3     Day of Surgery Review of History & Physical:    H&P update referred to the surgeon.     Anesthesia Plan Notes: Labs and EKG today.    Addendum (LKR): EKG with septal Q's/flipped T's. Labs with bump in Creat from 1.4 to 1.7 over last 7 months. Clearance requested from PCP    Cleared by Dr. Maddi Woodall For Surgery From Anesthesia Perspective.     Lab Results   Component Value Date    WBC 6.70 06/05/2013    HGB 14.5 08/15/2014    HCT 42.4 08/15/2014     06/05/2013    CHOL 147 08/02/2012    TRIG 680 (H) 08/02/2012    HDL 23 (L) 08/02/2012    ALT 42 01/10/2017    AST 28 01/10/2017     05/21/2018    K 4.3 05/21/2018     05/21/2018    CREATININE 1.4 05/21/2018    BUN 25 (H) 05/21/2018    CO2 24 05/21/2018    TSH 2.183 08/02/2012    PSA 0.55 05/21/2018    HGBA1C 8.8 (H) 06/05/2013

## 2018-12-20 NOTE — DISCHARGE INSTRUCTIONS
PRE-ADMIT TESTING -  [phone]    [address]  [place] [facility]          Your surgery has been scheduled at [facility]. We are pleased to have the opportunity to serve you. For Further Information please call [phone].    On the day of surgery please report to the Information Desk on the 1st floor.    · CONTACT YOUR PHYSICIAN'S OFFICE THE DAY PRIOR TO YOUR SURGERY TO OBTAIN YOUR ARRIVAL TIME.     · The evening before surgery do not eat anything after 9 p.m. ( this includes hard candy, chewing gum and mints).  You may only have GATORADE, POWERADE AND WATER  from 9 p.m. until you leave your home.   DO NOT DRINK ANY LIQUIDS ON THE WAY TO THE HOSPITAL.      SPECIAL MEDICATION INSTRUCTIONS: TAKE medications checked off by the Anesthesiologist on your Medication List.    Angiogram Patients: Take medications as instructed by your physician, including aspirin.     Surgery Patients:    If you take ASPIRIN - Your PHYSICIAN/SURGEON will need to inform you IF/OR when you need to stop taking aspirin prior to your surgery.     Do Not take any medications containing IBUPROFEN.  Do Not Wear any make-up or dark nail polish   (especially eye make-up) to surgery. If you come to surgery with makeup on you will be required to remove the makeup or nail polish.    Do not shave your surgical area at least 5 days prior to your surgery. The surgical prep will be performed at the hospital according to Infection Control regulations.    Leave all valuables at home.   Do Not wear any jewelry or watches, including any metal in body piercings.  Contact Lens must be removed before surgery. Either do not wear the contact lens or bring a case and solution for storage.  Please bring a container for eyeglasses or dentures as required.  Bring any paperwork your physician has provided, such as consent forms,  history and physicals, doctor's orders, etc.   Bring comfortable clothes that are loose fitting to wear upon  discharge. Take into consideration the type of surgery being performed.  Maintain your diet as advised per your physician the day prior to surgery.      Adequate rest the night before surgery is advised.   Park in the Parking lot behind the hospital or in the [facility] across the street from the parking lot. Parking is complimentary.  If you will be discharged the same day as your procedure, please arrange for a responsible adult to drive you home or to accompany you if traveling by taxi.   YOU WILL NOT BE PERMITTED TO DRIVE OR TO LEAVE THE HOSPITAL ALONE AFTER SURGERY.   It is strongly recommended that you arrange for someone to remain with you for the first 24 hrs following your surgery.       Thank you for your cooperation.  The Staff of [facility].        Bathing Instructions                                                                 Please shower the evening before and morning of your procedure with    ANTIBACTERIAL SOAP. ( DIAL, etc )  Concentrate on the surgical area   for at least 3 minutes and rinse completely. Dry off as usual.   Do not use any deodorant, powder, body lotions, perfume, after shave or    cologne.

## 2018-12-20 NOTE — PRE ADMISSION SCREENING
Clearance requested re EKG per Dr Bustamante. Spoke with Ana with Dr Rodríguez. Dr Hall s office notified.

## 2018-12-20 NOTE — TELEPHONE ENCOUNTER
Spoke to Donald, per Dr Rodríguez pt is cleared to proceed with surgery, Donald will fax new clearance.

## 2018-12-21 NOTE — PRE ADMISSION SCREENING
Labs faxed to dr calabrese,reviewed per ,spoke with selvin dr arambula's office,stated he would show dr calabrese the labs and stated the patient was cleared from the ekg.

## 2018-12-26 NOTE — PRE ADMISSION SCREENING
Patient stated he spoke with  regarding clearance and elevated kidney function labs,discontinued jardiance, and he will see dr calabrese after surgery ,cleared for surgery.clearance received.

## 2018-12-28 ENCOUNTER — ANESTHESIA (OUTPATIENT)
Dept: SURGERY | Facility: OTHER | Age: 53
End: 2018-12-28
Payer: COMMERCIAL

## 2018-12-28 ENCOUNTER — HOSPITAL ENCOUNTER (OUTPATIENT)
Facility: OTHER | Age: 53
Discharge: HOME | End: 2018-12-28
Attending: ORTHOPAEDIC SURGERY | Admitting: ORTHOPAEDIC SURGERY
Payer: COMMERCIAL

## 2018-12-28 VITALS
WEIGHT: 280 LBS | OXYGEN SATURATION: 98 % | HEART RATE: 77 BPM | BODY MASS INDEX: 40.09 KG/M2 | SYSTOLIC BLOOD PRESSURE: 124 MMHG | DIASTOLIC BLOOD PRESSURE: 67 MMHG | HEIGHT: 70 IN | TEMPERATURE: 98 F | RESPIRATION RATE: 16 BRPM

## 2018-12-28 DIAGNOSIS — M75.101 NON-TRAUMATIC ROTATOR CUFF TEAR, RIGHT: ICD-10-CM

## 2018-12-28 DIAGNOSIS — M75.21 BICEPS TENDONITIS, RIGHT: ICD-10-CM

## 2018-12-28 DIAGNOSIS — S43.431D SUPERIOR GLENOID LABRUM LESION OF RIGHT SHOULDER, SUBSEQUENT ENCOUNTER: ICD-10-CM

## 2018-12-28 LAB
POCT GLUCOSE: 169 MG/DL (ref 70–110)
POCT GLUCOSE: 203 MG/DL (ref 70–110)

## 2018-12-28 PROCEDURE — 71000015 HC POSTOP RECOV 1ST HR: Performed by: ORTHOPAEDIC SURGERY

## 2018-12-28 PROCEDURE — 71000033 HC RECOVERY, INTIAL HOUR: Performed by: ORTHOPAEDIC SURGERY

## 2018-12-28 PROCEDURE — 25000003 PHARM REV CODE 250: Performed by: ANESTHESIOLOGY

## 2018-12-28 PROCEDURE — 29826 PR SHLDR ARTHROSCOP,PART ACROMIOPLAS: ICD-10-PCS | Mod: AS,RT,, | Performed by: PHYSICIAN ASSISTANT

## 2018-12-28 PROCEDURE — 25000003 PHARM REV CODE 250: Performed by: ORTHOPAEDIC SURGERY

## 2018-12-28 PROCEDURE — 29828 SHO ARTHRS SRG BICP TENODSIS: CPT | Mod: 22,RT,, | Performed by: ORTHOPAEDIC SURGERY

## 2018-12-28 PROCEDURE — 37000009 HC ANESTHESIA EA ADD 15 MINS: Performed by: ORTHOPAEDIC SURGERY

## 2018-12-28 PROCEDURE — 36000711: Performed by: ORTHOPAEDIC SURGERY

## 2018-12-28 PROCEDURE — 29826 SHO ARTHRS SRG DECOMPRESSION: CPT | Mod: AS,RT,, | Performed by: PHYSICIAN ASSISTANT

## 2018-12-28 PROCEDURE — 29823 SHO ARTHRS SRG XTNSV DBRDMT: CPT | Mod: AS,51,RT, | Performed by: PHYSICIAN ASSISTANT

## 2018-12-28 PROCEDURE — 63600175 PHARM REV CODE 636 W HCPCS: Performed by: ORTHOPAEDIC SURGERY

## 2018-12-28 PROCEDURE — 27201423 OPTIME MED/SURG SUP & DEVICES STERILE SUPPLY: Performed by: ORTHOPAEDIC SURGERY

## 2018-12-28 PROCEDURE — 29823 SHO ARTHRS SRG XTNSV DBRDMT: CPT | Mod: 51,RT,, | Performed by: ORTHOPAEDIC SURGERY

## 2018-12-28 PROCEDURE — 63600175 PHARM REV CODE 636 W HCPCS: Performed by: NURSE ANESTHETIST, CERTIFIED REGISTERED

## 2018-12-28 PROCEDURE — 63600175 PHARM REV CODE 636 W HCPCS: Performed by: ANESTHESIOLOGY

## 2018-12-28 PROCEDURE — 29826 PR SHLDR ARTHROSCOP,PART ACROMIOPLAS: ICD-10-PCS | Mod: RT,,, | Performed by: ORTHOPAEDIC SURGERY

## 2018-12-28 PROCEDURE — 29828 PR ARTHROSCOPY SHOULDER SURGICAL BICEPS TENODESIS: ICD-10-PCS | Mod: 22,RT,, | Performed by: ORTHOPAEDIC SURGERY

## 2018-12-28 PROCEDURE — 36000710: Performed by: ORTHOPAEDIC SURGERY

## 2018-12-28 PROCEDURE — 29828 SHO ARTHRS SRG BICP TENODSIS: CPT | Mod: AS,22,RT, | Performed by: PHYSICIAN ASSISTANT

## 2018-12-28 PROCEDURE — 25000003 PHARM REV CODE 250: Performed by: SPECIALIST

## 2018-12-28 PROCEDURE — 25000003 PHARM REV CODE 250: Performed by: NURSE ANESTHETIST, CERTIFIED REGISTERED

## 2018-12-28 PROCEDURE — S0077 INJECTION, CLINDAMYCIN PHOSP: HCPCS | Performed by: PHYSICIAN ASSISTANT

## 2018-12-28 PROCEDURE — 29823 PR SHLDR ARTHROSCOP,EXTEN DEBRIDE: ICD-10-PCS | Mod: AS,51,RT, | Performed by: PHYSICIAN ASSISTANT

## 2018-12-28 PROCEDURE — 37000008 HC ANESTHESIA 1ST 15 MINUTES: Performed by: ORTHOPAEDIC SURGERY

## 2018-12-28 PROCEDURE — 25000003 PHARM REV CODE 250: Performed by: PHYSICIAN ASSISTANT

## 2018-12-28 PROCEDURE — 29828 PR ARTHROSCOPY SHOULDER SURGICAL BICEPS TENODESIS: ICD-10-PCS | Mod: AS,22,RT, | Performed by: PHYSICIAN ASSISTANT

## 2018-12-28 PROCEDURE — 29823 PR SHLDR ARTHROSCOP,EXTEN DEBRIDE: ICD-10-PCS | Mod: 51,RT,, | Performed by: ORTHOPAEDIC SURGERY

## 2018-12-28 PROCEDURE — 71000016 HC POSTOP RECOV ADDL HR: Performed by: ORTHOPAEDIC SURGERY

## 2018-12-28 PROCEDURE — 29826 SHO ARTHRS SRG DECOMPRESSION: CPT | Mod: RT,,, | Performed by: ORTHOPAEDIC SURGERY

## 2018-12-28 RX ORDER — OXYCODONE HYDROCHLORIDE 5 MG/1
5 TABLET ORAL
Status: DISCONTINUED | OUTPATIENT
Start: 2018-12-28 | End: 2018-12-28 | Stop reason: HOSPADM

## 2018-12-28 RX ORDER — MEPERIDINE HYDROCHLORIDE 50 MG/ML
12.5 INJECTION INTRAMUSCULAR; INTRAVENOUS; SUBCUTANEOUS ONCE AS NEEDED
Status: DISCONTINUED | OUTPATIENT
Start: 2018-12-28 | End: 2018-12-28 | Stop reason: HOSPADM

## 2018-12-28 RX ORDER — CYCLOBENZAPRINE HCL 10 MG
10 TABLET ORAL ONCE
Status: COMPLETED | OUTPATIENT
Start: 2018-12-28 | End: 2018-12-28

## 2018-12-28 RX ORDER — KETOROLAC TROMETHAMINE 30 MG/ML
INJECTION, SOLUTION INTRAMUSCULAR; INTRAVENOUS
Status: DISCONTINUED | OUTPATIENT
Start: 2018-12-28 | End: 2018-12-28 | Stop reason: HOSPADM

## 2018-12-28 RX ORDER — ROPIVACAINE HYDROCHLORIDE 5 MG/ML
INJECTION, SOLUTION EPIDURAL; INFILTRATION; PERINEURAL
Status: DISCONTINUED | OUTPATIENT
Start: 2018-12-28 | End: 2018-12-28 | Stop reason: HOSPADM

## 2018-12-28 RX ORDER — FAMOTIDINE 20 MG/1
20 TABLET, FILM COATED ORAL
Status: COMPLETED | OUTPATIENT
Start: 2018-12-28 | End: 2018-12-28

## 2018-12-28 RX ORDER — ONDANSETRON 2 MG/ML
4 INJECTION INTRAMUSCULAR; INTRAVENOUS DAILY PRN
Status: DISCONTINUED | OUTPATIENT
Start: 2018-12-28 | End: 2018-12-28 | Stop reason: HOSPADM

## 2018-12-28 RX ORDER — SUCCINYLCHOLINE CHLORIDE 20 MG/ML
INJECTION INTRAMUSCULAR; INTRAVENOUS
Status: DISCONTINUED | OUTPATIENT
Start: 2018-12-28 | End: 2018-12-28

## 2018-12-28 RX ORDER — GLYCOPYRROLATE 0.2 MG/ML
INJECTION INTRAMUSCULAR; INTRAVENOUS
Status: DISCONTINUED | OUTPATIENT
Start: 2018-12-28 | End: 2018-12-28

## 2018-12-28 RX ORDER — HYDROMORPHONE HCL 2 MG/ML
0.4 VIAL (ML) INJECTION EVERY 5 MIN PRN
Status: DISCONTINUED | OUTPATIENT
Start: 2018-12-28 | End: 2018-12-28 | Stop reason: HOSPADM

## 2018-12-28 RX ORDER — KETOROLAC TROMETHAMINE 30 MG/ML
INJECTION, SOLUTION INTRAMUSCULAR; INTRAVENOUS
Status: DISCONTINUED | OUTPATIENT
Start: 2018-12-28 | End: 2018-12-28

## 2018-12-28 RX ORDER — ACETAMINOPHEN 325 MG/1
650 TABLET ORAL EVERY 4 HOURS PRN
Status: CANCELLED | OUTPATIENT
Start: 2018-12-28

## 2018-12-28 RX ORDER — MORPHINE SULFATE 10 MG/ML
3 INJECTION INTRAMUSCULAR; INTRAVENOUS; SUBCUTANEOUS EVERY 10 MIN PRN
Status: CANCELLED | OUTPATIENT
Start: 2018-12-28

## 2018-12-28 RX ORDER — ONDANSETRON 2 MG/ML
INJECTION INTRAMUSCULAR; INTRAVENOUS
Status: DISCONTINUED | OUTPATIENT
Start: 2018-12-28 | End: 2018-12-28

## 2018-12-28 RX ORDER — PROPOFOL 10 MG/ML
VIAL (ML) INTRAVENOUS
Status: DISCONTINUED | OUTPATIENT
Start: 2018-12-28 | End: 2018-12-28

## 2018-12-28 RX ORDER — OXYCODONE HCL 10 MG/1
10 TABLET, FILM COATED, EXTENDED RELEASE ORAL ONCE
Status: COMPLETED | OUTPATIENT
Start: 2018-12-28 | End: 2018-12-28

## 2018-12-28 RX ORDER — EPINEPHRINE 1 MG/ML
INJECTION, SOLUTION INTRACARDIAC; INTRAMUSCULAR; INTRAVENOUS; SUBCUTANEOUS
Status: DISCONTINUED | OUTPATIENT
Start: 2018-12-28 | End: 2018-12-28 | Stop reason: HOSPADM

## 2018-12-28 RX ORDER — FENTANYL CITRATE 50 UG/ML
25 INJECTION, SOLUTION INTRAMUSCULAR; INTRAVENOUS EVERY 5 MIN PRN
Status: DISCONTINUED | OUTPATIENT
Start: 2018-12-28 | End: 2018-12-28 | Stop reason: HOSPADM

## 2018-12-28 RX ORDER — CLINDAMYCIN PHOSPHATE 900 MG/50ML
900 INJECTION, SOLUTION INTRAVENOUS
Status: COMPLETED | OUTPATIENT
Start: 2018-12-28 | End: 2018-12-28

## 2018-12-28 RX ORDER — OXYCODONE HYDROCHLORIDE 5 MG/1
5 TABLET ORAL ONCE
Status: COMPLETED | OUTPATIENT
Start: 2018-12-28 | End: 2018-12-28

## 2018-12-28 RX ORDER — LIDOCAINE HYDROCHLORIDE 10 MG/ML
0.5 INJECTION, SOLUTION EPIDURAL; INFILTRATION; INTRACAUDAL; PERINEURAL ONCE
Status: DISCONTINUED | OUTPATIENT
Start: 2018-12-28 | End: 2018-12-28 | Stop reason: HOSPADM

## 2018-12-28 RX ORDER — ACETAMINOPHEN 500 MG
1500 TABLET ORAL EVERY 6 HOURS PRN
COMMUNITY

## 2018-12-28 RX ORDER — KETAMINE HYDROCHLORIDE 50 MG/ML
INJECTION, SOLUTION INTRAMUSCULAR; INTRAVENOUS
Status: DISCONTINUED | OUTPATIENT
Start: 2018-12-28 | End: 2018-12-28 | Stop reason: HOSPADM

## 2018-12-28 RX ORDER — SODIUM CHLORIDE 0.9 % (FLUSH) 0.9 %
3 SYRINGE (ML) INJECTION
Status: DISCONTINUED | OUTPATIENT
Start: 2018-12-28 | End: 2018-12-28 | Stop reason: HOSPADM

## 2018-12-28 RX ORDER — SODIUM CHLORIDE, SODIUM LACTATE, POTASSIUM CHLORIDE, CALCIUM CHLORIDE 600; 310; 30; 20 MG/100ML; MG/100ML; MG/100ML; MG/100ML
INJECTION, SOLUTION INTRAVENOUS CONTINUOUS
Status: DISCONTINUED | OUTPATIENT
Start: 2018-12-28 | End: 2018-12-28 | Stop reason: HOSPADM

## 2018-12-28 RX ORDER — OXYCODONE HYDROCHLORIDE 5 MG/1
10 TABLET ORAL EVERY 4 HOURS PRN
Status: CANCELLED | OUTPATIENT
Start: 2018-12-28

## 2018-12-28 RX ORDER — ROCURONIUM BROMIDE 10 MG/ML
INJECTION, SOLUTION INTRAVENOUS
Status: DISCONTINUED | OUTPATIENT
Start: 2018-12-28 | End: 2018-12-28

## 2018-12-28 RX ORDER — FENTANYL CITRATE 50 UG/ML
INJECTION, SOLUTION INTRAMUSCULAR; INTRAVENOUS
Status: DISCONTINUED | OUTPATIENT
Start: 2018-12-28 | End: 2018-12-28

## 2018-12-28 RX ORDER — PREGABALIN 75 MG/1
75 CAPSULE ORAL ONCE
Status: COMPLETED | OUTPATIENT
Start: 2018-12-28 | End: 2018-12-28

## 2018-12-28 RX ORDER — NEOSTIGMINE METHYLSULFATE 1 MG/ML
INJECTION, SOLUTION INTRAVENOUS
Status: DISCONTINUED | OUTPATIENT
Start: 2018-12-28 | End: 2018-12-28

## 2018-12-28 RX ORDER — LIDOCAINE HCL/PF 100 MG/5ML
SYRINGE (ML) INTRAVENOUS
Status: DISCONTINUED | OUTPATIENT
Start: 2018-12-28 | End: 2018-12-28

## 2018-12-28 RX ADMIN — CYCLOBENZAPRINE HYDROCHLORIDE 10 MG: 10 TABLET, FILM COATED ORAL at 10:12

## 2018-12-28 RX ADMIN — CLINDAMYCIN PHOSPHATE 900 MG: 18 INJECTION, SOLUTION INTRAVENOUS at 07:12

## 2018-12-28 RX ADMIN — SUCCINYLCHOLINE CHLORIDE 120 MG: 20 INJECTION, SOLUTION INTRAMUSCULAR; INTRAVENOUS at 07:12

## 2018-12-28 RX ADMIN — FAMOTIDINE 20 MG: 20 TABLET, FILM COATED ORAL at 05:12

## 2018-12-28 RX ADMIN — NEOSTIGMINE METHYLSULFATE 5 MG: 1 INJECTION INTRAVENOUS at 08:12

## 2018-12-28 RX ADMIN — OXYCODONE HYDROCHLORIDE 10 MG: 10 TABLET, FILM COATED, EXTENDED RELEASE ORAL at 05:12

## 2018-12-28 RX ADMIN — HYDROMORPHONE HYDROCHLORIDE 0.4 MG: 2 INJECTION INTRAMUSCULAR; INTRAVENOUS; SUBCUTANEOUS at 08:12

## 2018-12-28 RX ADMIN — FENTANYL CITRATE 100 MCG: 50 INJECTION, SOLUTION INTRAMUSCULAR; INTRAVENOUS at 07:12

## 2018-12-28 RX ADMIN — ROCURONIUM BROMIDE 30 MG: 10 INJECTION, SOLUTION INTRAVENOUS at 07:12

## 2018-12-28 RX ADMIN — KETOROLAC TROMETHAMINE 30 MG: 30 INJECTION, SOLUTION INTRAMUSCULAR; INTRAVENOUS at 08:12

## 2018-12-28 RX ADMIN — ROCURONIUM BROMIDE 10 MG: 10 INJECTION, SOLUTION INTRAVENOUS at 07:12

## 2018-12-28 RX ADMIN — PROMETHAZINE HYDROCHLORIDE 3.12 MG: 25 INJECTION, SOLUTION INTRAMUSCULAR; INTRAVENOUS at 09:12

## 2018-12-28 RX ADMIN — OXYCODONE HYDROCHLORIDE 5 MG: 5 TABLET ORAL at 10:12

## 2018-12-28 RX ADMIN — PREGABALIN 75 MG: 75 CAPSULE ORAL at 05:12

## 2018-12-28 RX ADMIN — ONDANSETRON 4 MG: 2 INJECTION INTRAMUSCULAR; INTRAVENOUS at 08:12

## 2018-12-28 RX ADMIN — LIDOCAINE HYDROCHLORIDE 100 MG: 20 INJECTION, SOLUTION INTRAVENOUS at 07:12

## 2018-12-28 RX ADMIN — PROPOFOL 200 MG: 10 INJECTION, EMULSION INTRAVENOUS at 07:12

## 2018-12-28 RX ADMIN — SODIUM CHLORIDE, SODIUM LACTATE, POTASSIUM CHLORIDE, AND CALCIUM CHLORIDE: 600; 310; 30; 20 INJECTION, SOLUTION INTRAVENOUS at 06:12

## 2018-12-28 RX ADMIN — GLYCOPYRROLATE 0.8 MG: 0.2 INJECTION, SOLUTION INTRAMUSCULAR; INTRAVENOUS at 08:12

## 2018-12-28 NOTE — TRANSFER OF CARE
"Anesthesia Transfer of Care Note    Patient: Erick Espinosa    Procedure(s) Performed: Procedure(s) (LRB):  REPAIR, ROTATOR CUFF, ARTHROSCOPIC (Right)  DEBRIDEMENT, SHOULDER, ARTHROSCOPIC (Right)  FIXATION, TENDON, Right Shoulder Biceps Tenodesis (Right)  REPAIR, Microfracture (Crimson Duvet Technique) Right Shoulder (Right)  MANIPULATION, WITH ANESTHESIA, Lysis of Adhesions (Right)    Patient location: PACU    Anesthesia Type: general    Transport from OR: Transported from OR on 2-3 L/min O2 by NC with adequate spontaneous ventilation    Post pain: adequate analgesia    Post assessment: no apparent anesthetic complications    Post vital signs: stable    Level of consciousness: awake, alert and oriented    Nausea/Vomiting: no nausea/vomiting    Complications: none          Last vitals:   Visit Vitals  /78 (BP Location: Left arm, Patient Position: Lying)   Pulse 96   Temp 36.6 °C (97.8 °F) (Oral)   Resp 18   Ht 5' 10" (1.778 m)   Wt 127 kg (279 lb 16 oz)   SpO2 97%   BMI 40.18 kg/m²     "

## 2018-12-28 NOTE — PLAN OF CARE
Erick Espinosa has met all discharge criteria from Phase II. Vital Signs are stable, ambulating  without difficulty. Discharge instructions given, patient verbalized understanding. Discharged from facility via wheelchair in stable condition.

## 2018-12-28 NOTE — OR NURSING
States he has problems after surgery maintaing O2 sats; informed him to use Incentive Spirometer after surgery.

## 2018-12-28 NOTE — INTERVAL H&P NOTE
The patient has been examined and the H&P has been reviewed:    I concur with the findings and no changes have occurred since H&P was written.    Anesthesia/Surgery risks, benefits and alternative options discussed and understood by patient/family.    Patient has been cleared for surgery per Dr. Rodríguez.           Active Hospital Problems    Diagnosis  POA    Non-traumatic rotator cuff tear, right [M75.101]  Yes      Resolved Hospital Problems   No resolved problems to display.

## 2018-12-28 NOTE — PROGRESS NOTES
"Patient complaining of pain and burning to right shoulder, rates pain a "7-8" at this time. Dr. Diop paged and notified of above. Also notified of issues with O2 sats dropping after last surgery. Orders noted for flexeril 10mg PO and Oxyocdone 5mg po X1 now. Will continue to monitor.    "

## 2018-12-28 NOTE — OP NOTE
DATE OF PROCEDURE: 12/28/2018    SURGEON: Malgorzata Hall M.D.    ASSISTANT: CADENCE Nelson PA-C  The use of an assistant was medically necessary for positioning, skin retraction, and assistance with this procedure. The procedure could not be performed without the use of an assistant.   There was no qualified resident/fellow available for assistance with this procedure.    PREOPERATIVE DIAGNOSES:   right  1. Shoulder partial thickness cuff tear  2. Shoulder biceps tendonitis  3. Shoulder synovitis  4. Shoulder SLAP  5. Shoulder impingement, subacromial bursitis  6. Shoulder chondromalacia  7. Shoulder labral tear  8. Morbid obesity    POSTOPERATIVE DIAGNOSES:   right  1. Shoulder partial thickness cuff tear  2. Shoulder biceps tendonitis  3. Shoulder synovitis  4. Shoulder SLAP  5. Shoulder impingement, subacromial bursitis  6. Shoulder chondromalacia  7. Shoulder labral tear  8. Morbid obesity    OPERATION:   right  1. Shoulder arthroscopic biceps tenodesis (CPT 34597) (complex, -22 modifier)  2. Shoulder arthroscopic partial thickness cuff debridement  3. Shoulder arthroscopic subacromial decompression, bursectomy  4. Shoulder arthroscopic extensive debridement (anterior, posterior glenohumeral joint, subacromial space, labrum anteriorly, labrum inferiorly, labrum posteriorly) (CPT 68000)    ANESTHESIA:  General with intra-op suprascapular nerve block    BLOOD LOSS: Minimal.     DRAINS: None.     TOURNIQUET TIME: None.     COMPLICATIONS: None. The patient was moved to the recovery room in stable condition with compartments soft and cap refill less than a second in all digits.     COMPLEX PROCEDURE:  There was an altered surgical field. There was abnormal anatomy. There was major scarring to the area due to the chronicity of the condition and morbid obesity (Body mass index is 40.18 kg/m². with diabetes, HTN and comorbidities). Complexity of the service was much greater than the normative procedure. There was increased  time, intensity and technical difficulty of the procedure, severity of the patient's condition and mental effort required.  This was a highly complicated case that required advanced arthroscopic skill in order to safely and technically perform it.  Nevertheless the repair achieved was excellent.    BRIEF INDICATION OF MEDICAL NECESSITY: The patient is a 53 y.o. year-old male who has history and physical examination findings consistent with the above. Nonoperative versus operative options were discussed. The risks and benefits were discussed with the patient. The patient acknowledged understanding and wished to proceed with operative intervention. Informed consent was obtained prior to the procedure. Reasonable expectations and potential complications were discussed and acknowledged, including but not limited to infection, bleeding, blood clots, (DVT and/or PE), nerve injury, re-tear, instability, continued pain and stiffness. They agreed and   understood and wished to proceed.     EXAMINATION UNDER ANESTHESIA OF THE right SHOULDER: Forward elevation 175 degrees, External rotation at 0-60 degrees, External rotation at 0-90 degrees, Internal rotation at 90-60 degrees. Translation testing: anterior grade 1, posterior grade 1, sulcus sign grade 1 corrects to 0 on external rotation. .    EXAMINATION UNDER ANESTHESIA OF THE NONOPERATED left SHOULDER: Forward elevation 175 degrees, External rotation at 0-60 degrees, External rotation at 0-90 degrees, Internal rotation at 90-60 degrees. Translation testing: anterior grade 1, posterior grade 1, sulcus sign grade 1 corrects to 0 on external rotation.     PROCEDURE IN DETAIL: After the correct operative site was marked by the operating surgeon. The patient was then taken to the operating room and placed supine on the operating room table, where the patient underwent general anesthesia by the anesthesia team.  Manipulation was performed gently with a palpable release of  adhesions.  ROM post-manipulation as listed above.  The patient was then rolled into the lateral decubitus position with the operative side up. A well-padded axillary roll, beanbag and pillows were placed. All pressure points were carefully padded and checked. The upper extremities and both lower extremities were placed in comfortable positions and were also well-padded. The left upper extremity was then prepped and draped in the usual sterile fashion.     Suprascapular nerve block was performed in the standard fashion with 10cc local anesthetic.     The Spider arm positioner was implemented with balanced suspension and appropriate landmarks were noted on the skin. A posterior followed by renny-superior portals were created and systematic examination of the joint revealed the following:     There was no evidence of any significant chondral lesions to the glenoid or humeral head.     Partial thickness 30% cuff tearing on articular side was debrided with shaver and gently with thermal device.    There was some synovitis and in the labrum that was debrided as needed, both anteriorly and posteriorly in the glenohumeral joint.     Tenosynovitis and SLAP type II was noted to the biceps tendon on ramp sign.    COMPLEX PROCEDURE:  There was an altered surgical field. There was abnormal anatomy. There was major scarring to the area due to the chronicity of the condition and morbid obesity (Body mass index is 40.18 kg/m². with diabetes, HTN and comorbidities). Complexity of the service was much greater than the normative procedure. There was increased time, intensity and technical difficulty of the procedure, severity of the patient's condition and mental effort required.  This was a highly complicated case that required advanced arthroscopic skill in order to safely and technically perform it.  Nevertheless the repair achieved was excellent.    Biceps auto-tenodesis was performed using thermal device.  Part of superior  labrum was included to allow the biceps tendon to auto-tenodese.  Attention was then turned to the rotator cuff.  The rotator cuff undersurface was then visualized and debrided as needed using a shaver.      Attention was then turned to the subacromial space where a significant hypertrophic bursa was encountered.  The bursa itself was thickened and hypertrophic.  A lateral portal was created to assist with the bursectomy.  Bursal side of the rotator cuff was intact.  Subacromial decompression was performed with arthroscopic jazmyn.    Debridement was performed with the shaver and thermal device in the anterior glenohumeral joint, posterior glenohumeral joint and subacromial space.    Suprascapular nerve block was performed in the standard fashion with 10cc local anesthetic. Local was placed about portals and incision(s) after irrigation, as described below, was completed. The shoulder was then irrigated and fluid was extravasated using suction. All portals were reapproximated using inverted 4-0 Monocryl suture in the subcutaneous tissue of the portals. Mastisol and Steri-strips were placed with Xeroform, 4x4s, ABD pad, and Medi-pore tape. An Iceman was secured in the shoulder holden. A shoulder holden was secured. The patient was then moved to supine, extubated and taken to the recovery room where the patient arrived in stable condition with the compartments of the arm and forearm soft and cap refill less than a second in all digits.    POST OPERATIVE PLAN: We will follow the arthroscopic biceps tenodesis guidelines. We discussed with the patient's family after surgery. The patient will remain in a sling for 3-4 weeks.  Wean sling x 3-4 weeks, no ROM restrictions.  No lifting > 1 pound x 6 weeks.

## 2018-12-28 NOTE — ANESTHESIA POSTPROCEDURE EVALUATION
"Anesthesia Post Evaluation    Patient: Erick Espinosa    Procedure(s) Performed: Procedure(s) (LRB):  REPAIR, ROTATOR CUFF, ARTHROSCOPIC (Right)  DEBRIDEMENT, SHOULDER, ARTHROSCOPIC (Right)  FIXATION, TENDON, Right Shoulder Biceps Tenodesis (Right)  REPAIR, Microfracture (Crimson Duvet Technique) Right Shoulder (Right)  MANIPULATION, WITH ANESTHESIA, Lysis of Adhesions (Right)    Final Anesthesia Type: general  Patient location during evaluation: PACU  Patient participation: Yes- Able to Participate  Level of consciousness: awake and alert  Post-procedure vital signs: reviewed and stable  Pain management: adequate  Airway patency: patent  PONV status at discharge: No PONV  Anesthetic complications: no      Cardiovascular status: blood pressure returned to baseline  Respiratory status: unassisted, spontaneous ventilation and room air  Hydration status: euvolemic  Follow-up not needed.        Visit Vitals  /80 (BP Location: Left arm, Patient Position: Lying)   Pulse 78   Temp 36.8 °C (98.2 °F) (Oral)   Resp 16   Ht 5' 10" (1.778 m)   Wt 127 kg (279 lb 16 oz)   SpO2 97%   BMI 40.18 kg/m²       Pain/Riley Score: Pain Rating Prior to Med Admin: 8 (12/28/2018 10:16 AM)  Riley Score: 10 (12/28/2018  9:40 AM)        "

## 2018-12-28 NOTE — DISCHARGE INSTRUCTIONS

## 2018-12-28 NOTE — PROCEDURE NOTE ADDENDUM
Certification of Assistant at Surgery       Surgery Date: 12/28/2018     Participating Surgeons:  Surgeon(s) and Role:     * Malgorzata Hall MD - Primary  \  CADENCE Nelson PA-C - 1st Assistant    Procedures:  Procedure(s) (LRB):  REPAIR, ROTATOR CUFF, ARTHROSCOPIC (Right)  DEBRIDEMENT, SHOULDER, ARTHROSCOPIC (Right)  FIXATION, TENDON, Right Shoulder Biceps Tenodesis (Right)  REPAIR, Microfracture (Crimson Duvet Technique) Right Shoulder (Right)  MANIPULATION, WITH ANESTHESIA, Lysis of Adhesions (Right)    Assistant Surgeon's Certification of Necessity:  I understand that section 1842 (b) (6) (d) of the Social Security Act generally prohibits Medicare Part B reasonable charge payment for the services of assistants at surgery in teaching hospitals when qualified residents are available to furnish such services. I certify that the services for which payment is claimed were medically necessary, and that no qualified resident was available to perform the services. I further understand that these services are subject to post-payment review by the Medicare carrier.        Frederick Nelson PA-C    12/28/2018  8:57 AM

## 2018-12-28 NOTE — OR NURSING
Pt without change from previous assessment. Prepared for transfer to acu. States pain tolerable. Unable to tolerate more iv pain meds.

## 2019-01-03 DIAGNOSIS — M75.101 NON-TRAUMATIC ROTATOR CUFF TEAR, RIGHT: ICD-10-CM

## 2019-01-03 DIAGNOSIS — S43.431D SUPERIOR GLENOID LABRUM LESION OF RIGHT SHOULDER, SUBSEQUENT ENCOUNTER: ICD-10-CM

## 2019-01-03 DIAGNOSIS — M75.21 BICEPS TENDONITIS, RIGHT: ICD-10-CM

## 2019-01-03 RX ORDER — OXYCODONE AND ACETAMINOPHEN 10; 325 MG/1; MG/1
1 TABLET ORAL EVERY 6 HOURS PRN
Qty: 28 TABLET | Refills: 0 | Status: SHIPPED | OUTPATIENT
Start: 2019-01-03 | End: 2022-10-07

## 2019-01-03 NOTE — TELEPHONE ENCOUNTER
----- Message from Poonam Sanchez MA sent at 1/3/2019 11:59 AM CST -----  Contact: patient  Patient wants a refill on pain med.    ----- Message -----  From: Sophie Mayfield  Sent: 1/3/2019  11:56 AM  To: Isabel cMgarry Staff    Please call pt at [phone]    Patient is still waiting for his [place] paperwork to be completed.  Refill needed for oxyCODONE-acetaminophen (PERCOCET)  mg per tablet    Thank you

## 2019-01-03 NOTE — TELEPHONE ENCOUNTER
----- Message from Sophie Mayfield sent at 1/3/2019 11:56 AM CST -----  Contact: patient  Please call pt at [phone]    Patient is still waiting for his [facility] paperwork to be completed.  Refill needed for oxyCODONE-acetaminophen (PERCOCET)  mg per tablet    Thank you

## 2019-01-07 ENCOUNTER — TELEPHONE (OUTPATIENT)
Dept: SPORTS MEDICINE | Facility: CLINIC | Age: 54
End: 2019-01-07

## 2019-01-07 NOTE — TELEPHONE ENCOUNTER
Spoke to the patient. Informed him we received his forms from [facility]. Dr. Hall will complete them today and then we will fax them to [facility] for him.    Radha [facility]estins[facility]

## 2019-01-07 NOTE — TELEPHONE ENCOUNTER
----- Message from Taryn Hedrick sent at 1/7/2019  9:26 AM CST -----  Contact: Self  Patient requesting to know if hs FMLA paperwork has been received at clinic.  Says he spoke with Poonam last week and the clinic had not yet received it.  Patient requesting a call with an update at [phone]

## 2019-01-07 NOTE — TELEPHONE ENCOUNTER
Received FMLA forms from [employer]. Forms completed and faxed back to them at [phone].    Radha Celestins[facility]

## 2019-01-14 ENCOUNTER — OFFICE VISIT (OUTPATIENT)
Dept: SPORTS MEDICINE | Facility: CLINIC | Age: 54
End: 2019-01-14
Payer: COMMERCIAL

## 2019-01-14 VITALS
HEIGHT: 70 IN | HEART RATE: 89 BPM | WEIGHT: 279 LBS | SYSTOLIC BLOOD PRESSURE: 142 MMHG | BODY MASS INDEX: 39.94 KG/M2 | DIASTOLIC BLOOD PRESSURE: 93 MMHG

## 2019-01-14 DIAGNOSIS — M75.101 NON-TRAUMATIC ROTATOR CUFF TEAR, RIGHT: ICD-10-CM

## 2019-01-14 DIAGNOSIS — S43.431D SUPERIOR GLENOID LABRUM LESION OF RIGHT SHOULDER, SUBSEQUENT ENCOUNTER: ICD-10-CM

## 2019-01-14 DIAGNOSIS — M75.21 BICEPS TENDONITIS, RIGHT: ICD-10-CM

## 2019-01-14 PROCEDURE — 99999 PR PBB SHADOW E&M-EST. PATIENT-LVL IV: ICD-10-PCS | Mod: PBBFAC,,, | Performed by: ORTHOPAEDIC SURGERY

## 2019-01-14 PROCEDURE — 99999 PR PBB SHADOW E&M-EST. PATIENT-LVL IV: CPT | Mod: PBBFAC,,, | Performed by: ORTHOPAEDIC SURGERY

## 2019-01-14 PROCEDURE — 99024 PR POST-OP FOLLOW-UP VISIT: ICD-10-PCS | Mod: S$GLB,,, | Performed by: ORTHOPAEDIC SURGERY

## 2019-01-14 PROCEDURE — 99024 POSTOP FOLLOW-UP VISIT: CPT | Mod: S$GLB,,, | Performed by: ORTHOPAEDIC SURGERY

## 2019-01-14 RX ORDER — TRAMADOL HYDROCHLORIDE 50 MG/1
50 TABLET ORAL
Qty: 20 TABLET | Refills: 0 | Status: SHIPPED | OUTPATIENT
Start: 2019-01-14 | End: 2019-02-07 | Stop reason: SDUPTHER

## 2019-01-14 NOTE — PROGRESS NOTES
CC right shoulder pain    HISTORY OF PRESENT ILLNESS:   Pt is here today for first post-operative followup of his shoulder arthroscopy.  he is doing well.  We have reviewed his findings and discussed plan of care and future treatment options.      Pain is 4/10 today.  He is doing PT at [facility].  Some biceps cramping but overall doing well.    DATE OF PROCEDURE: 12/28/2018      OPERATION:   right  1. Shoulder arthroscopic biceps tenodesis (CPT 67543) (complex, -22 modifier)  2. Shoulder arthroscopic partial thickness cuff debridement  3. Shoulder arthroscopic subacromial decompression, bursectomy  4. Shoulder arthroscopic extensive debridement (anterior, posterior glenohumeral joint, subacromial space, labrum anteriorly, labrum inferiorly, labrum posteriorly) (CPT 09635)    There was no evidence of any significant chondral lesions to the glenoid or humeral head.                                                                                    PHYSICAL EXAMINATION:     Incision sites healed well  No evidence of any erythema, infection or induration  elbow Range of motion full   Minimal effusion  2+ radial pulse  No swelling                                                                               ASSESSMENT:                                                                                                                                               1. Status post above, doing well.                                                                                                                               PLAN:                                                                                                                                                     1. Continue PT  2. Emphasized scapular function.  3. I have discussed return to activity in detail.  4. he will see us back in 4 weeks.                                      5. All questions were answered and he should contact us if he has any  questions or concerns in the interim.

## 2019-01-22 ENCOUNTER — TELEPHONE (OUTPATIENT)
Dept: SPORTS MEDICINE | Facility: CLINIC | Age: 54
End: 2019-01-22

## 2019-01-22 NOTE — TELEPHONE ENCOUNTER
----- Message from Sophie Mayfield sent at 1/22/2019 11:26 AM CST -----  Contact: Delmy/[facility]   Please call Delmy at [phone] if any questions  Fax# [phone]    Please send a copy of the office visit from 01/14/19 asap    Thank you

## 2019-02-07 ENCOUNTER — TELEPHONE (OUTPATIENT)
Dept: SPORTS MEDICINE | Facility: CLINIC | Age: 54
End: 2019-02-07

## 2019-02-07 DIAGNOSIS — M75.101 NON-TRAUMATIC ROTATOR CUFF TEAR, RIGHT: ICD-10-CM

## 2019-02-07 DIAGNOSIS — M75.21 BICEPS TENDONITIS, RIGHT: ICD-10-CM

## 2019-02-07 DIAGNOSIS — S43.431D SUPERIOR GLENOID LABRUM LESION OF RIGHT SHOULDER, SUBSEQUENT ENCOUNTER: ICD-10-CM

## 2019-02-07 DIAGNOSIS — G89.18 POST-OPERATIVE PAIN: Primary | ICD-10-CM

## 2019-02-07 RX ORDER — TRAMADOL HYDROCHLORIDE 50 MG/1
50 TABLET ORAL
Qty: 28 TABLET | Refills: 0 | Status: SHIPPED | OUTPATIENT
Start: 2019-02-07 | End: 2022-10-07

## 2019-02-07 NOTE — TELEPHONE ENCOUNTER
----- Message from Minda Locke MA sent at 2/7/2019 11:04 AM CST -----  Contact: self  Last filled 1/14  ----- Message -----  From: Poonam Sanchez MA  Sent: 2/7/2019  10:55 AM  To: Minda Locke MA    Another refill request.  ----- Message -----  From: Hilda Dominguez  Sent: 2/7/2019  10:50 AM  To: Isabel Mcgarry Staff    Rx Refill/Request     Is this a Refill or New Rx:  Refill    Rx Name and Strength:  traMADol (ULTRAM) 50 mg tablet    Preferred Pharmacy with phone number: [facility]  [phone] (Phone)  [phone] (Fax)    Communication Preference: Phone     Additional Information: n/a

## 2019-02-08 ENCOUNTER — TELEPHONE (OUTPATIENT)
Dept: SPORTS MEDICINE | Facility: CLINIC | Age: 54
End: 2019-02-08

## 2019-02-08 NOTE — TELEPHONE ENCOUNTER
I called and spoke with the patient and he notes that he was calling just to update us that his medication needed prior authorization but that he went ahead and paid out of pocket for it because his shoulder was bothering him.     I thanked him for updating us and informed him (per Kobe's message) that he would have needed to pay out of pocket for the medication regardless. We discussed weaning off of the pain medication, etc.

## 2019-02-08 NOTE — TELEPHONE ENCOUNTER
----- Message from Frederick Nelson III, PA-C sent at 2/8/2019  2:35 PM CST -----  Contact: patient     Please inform this patient that we do not do prior auth's for this medication because the insurance will not pay for it.     He will have to pay cash for the prescription.   The cheapest prices for this medication without insurance are at [employer] or [facility] on [address].     Check [employer] first or we can call in to another pharmacy.     This med should only cost $7-20 at most without insurance.     Kobe    ----- Message -----  From: Alma Segovia MA  Sent: 2/8/2019   2:04 PM  To: Frederick Nelson III, PA-C    Please see below.     Alma Segovia   Clinical assistant to Dr. Malgorzata Hall    ----- Message -----  From: Sophie Mayfield  Sent: 2/8/2019   1:22 PM  To: Isabel Mcgarry Staff    Please call pt at [phone] if any questions    Prior authorization needed for traMADol (ULTRAM) 50 mg tablet    Use [facility], [place] - [address] AT [facility] [address]    Thank you

## 2019-02-25 ENCOUNTER — OFFICE VISIT (OUTPATIENT)
Dept: SPORTS MEDICINE | Facility: CLINIC | Age: 54
End: 2019-02-25
Payer: COMMERCIAL

## 2019-02-25 VITALS
HEART RATE: 102 BPM | BODY MASS INDEX: 39.94 KG/M2 | DIASTOLIC BLOOD PRESSURE: 94 MMHG | HEIGHT: 70 IN | SYSTOLIC BLOOD PRESSURE: 139 MMHG | WEIGHT: 279 LBS

## 2019-02-25 DIAGNOSIS — M75.21 BICEPS TENDONITIS, RIGHT: ICD-10-CM

## 2019-02-25 DIAGNOSIS — M75.41 ROTATOR CUFF IMPINGEMENT SYNDROME OF RIGHT SHOULDER: ICD-10-CM

## 2019-02-25 DIAGNOSIS — M75.101 NON-TRAUMATIC ROTATOR CUFF TEAR, RIGHT: ICD-10-CM

## 2019-02-25 DIAGNOSIS — Z98.890 S/P SHOULDER SURGERY: Primary | ICD-10-CM

## 2019-02-25 PROCEDURE — 99024 PR POST-OP FOLLOW-UP VISIT: ICD-10-PCS | Mod: S$GLB,,, | Performed by: PHYSICIAN ASSISTANT

## 2019-02-25 PROCEDURE — 99024 POSTOP FOLLOW-UP VISIT: CPT | Mod: S$GLB,,, | Performed by: PHYSICIAN ASSISTANT

## 2019-02-25 PROCEDURE — 99999 PR PBB SHADOW E&M-EST. PATIENT-LVL IV: CPT | Mod: PBBFAC,,, | Performed by: PHYSICIAN ASSISTANT

## 2019-02-25 PROCEDURE — 99999 PR PBB SHADOW E&M-EST. PATIENT-LVL IV: ICD-10-PCS | Mod: PBBFAC,,, | Performed by: PHYSICIAN ASSISTANT

## 2019-02-28 ENCOUNTER — TELEPHONE (OUTPATIENT)
Dept: SPORTS MEDICINE | Facility: CLINIC | Age: 54
End: 2019-02-28

## 2019-02-28 NOTE — PROGRESS NOTES
CC right shoulder pain    HISTORY OF PRESENT ILLNESS:   Pt is here today for first post-operative followup of his shoulder arthroscopy.  he is doing well.  We have reviewed his findings and discussed plan of care and future treatment options.      Pain is 4/10 today.    [facility]  Some biceps cramping but overall doing well.    DATE OF PROCEDURE: 12/28/2018      OPERATION:   right  1. Shoulder arthroscopic biceps tenodesis (CPT 37180) (complex, -22 modifier)  2. Shoulder arthroscopic partial thickness cuff debridement  3. Shoulder arthroscopic subacromial decompression, bursectomy  4. Shoulder arthroscopic extensive debridement (anterior, posterior glenohumeral joint, subacromial space, labrum anteriorly, labrum inferiorly, labrum posteriorly) (CPT 98864)    There was no evidence of any significant chondral lesions to the glenoid or humeral head.                                                                                    PHYSICAL EXAMINATION:     Incision sites healed well  No evidence of any erythema, infection or induration  elbow Range of motion full   Minimal effusion  2+ radial pulse  No swelling      ER 20                                                                               ASSESSMENT:                                                                                                                                               1. Status post above, doing well.                                                                                                                               PLAN:                                                                                                                                                     1. Continue PT but increase two 3 times per week due to my concerns of patient getting frozen shoulder. Continue aggressive HEP as discussed. I explained the importance of this to him.  Urged him to switch to [facility] for PT  to ensure he is getting proper PT but he refuses.   2. Emphasized scapular function.  3. I have discussed return to activity in detail.  4. he will see us back in 2-3 weeks.                                      5. All questions were answered and he should contact us if he has any questions or concerns in the interim.

## 2019-02-28 NOTE — TELEPHONE ENCOUNTER
----- Message from Sophie Mayfield sent at 2/28/2019  1:13 PM CST -----  [facility]  Please call Delmy at [phone] if any questions  Fax# [phone]    Claim# [id number]    Please send last office notes dated 02/25/19    Thank you

## 2019-03-01 ENCOUNTER — TELEPHONE (OUTPATIENT)
Dept: SPORTS MEDICINE | Facility: CLINIC | Age: 54
End: 2019-03-01

## 2019-03-01 NOTE — TELEPHONE ENCOUNTER
I called and informed the patient that his [facility] paperwork has been filled out and faxed today

## 2019-03-13 ENCOUNTER — OFFICE VISIT (OUTPATIENT)
Dept: SPORTS MEDICINE | Facility: CLINIC | Age: 54
End: 2019-03-13
Payer: COMMERCIAL

## 2019-03-13 VITALS
SYSTOLIC BLOOD PRESSURE: 139 MMHG | HEIGHT: 70 IN | WEIGHT: 279 LBS | HEART RATE: 88 BPM | BODY MASS INDEX: 39.94 KG/M2 | DIASTOLIC BLOOD PRESSURE: 90 MMHG

## 2019-03-13 DIAGNOSIS — M75.21 BICEPS TENDONITIS, RIGHT: Primary | ICD-10-CM

## 2019-03-13 PROCEDURE — 99024 PR POST-OP FOLLOW-UP VISIT: ICD-10-PCS | Mod: S$GLB,,, | Performed by: ORTHOPAEDIC SURGERY

## 2019-03-13 PROCEDURE — 99024 POSTOP FOLLOW-UP VISIT: CPT | Mod: S$GLB,,, | Performed by: ORTHOPAEDIC SURGERY

## 2019-03-13 PROCEDURE — 99999 PR PBB SHADOW E&M-EST. PATIENT-LVL IV: CPT | Mod: PBBFAC,,, | Performed by: ORTHOPAEDIC SURGERY

## 2019-03-13 PROCEDURE — 99999 PR PBB SHADOW E&M-EST. PATIENT-LVL IV: ICD-10-PCS | Mod: PBBFAC,,, | Performed by: ORTHOPAEDIC SURGERY

## 2019-03-13 RX ORDER — MELOXICAM 15 MG/1
15 TABLET ORAL DAILY
Qty: 60 TABLET | Refills: 2 | Status: SHIPPED | OUTPATIENT
Start: 2019-03-13 | End: 2022-10-07

## 2019-03-13 NOTE — PROGRESS NOTES
CC right shoulder pain    HISTORY OF PRESENT ILLNESS:   Pt is here today for first post-operative followup of his shoulder arthroscopy.  he is doing well.  We have reviewed his findings and discussed plan of care and future treatment options.      Pain is 3/10 today.    [facility]  Some biceps cramping but overall doing well.    DATE OF PROCEDURE: 12/28/2018      OPERATION:   right  1. Shoulder arthroscopic biceps tenodesis (CPT 92522) (complex, -22 modifier)  2. Shoulder arthroscopic partial thickness cuff debridement  3. Shoulder arthroscopic subacromial decompression, bursectomy  4. Shoulder arthroscopic extensive debridement (anterior, posterior glenohumeral joint, subacromial space, labrum anteriorly, labrum inferiorly, labrum posteriorly) (CPT 17324)    There was no evidence of any significant chondral lesions to the glenoid or humeral head.                                                                                    PHYSICAL EXAMINATION:     Incision sites healed well  No evidence of any erythema, infection or induration  elbow Range of motion full   Minimal effusion  2+ radial pulse  No swelling      ER 20  IR L1                                                                                 ASSESSMENT:                                                                                                                                               1. Status post above, doing well.                                                                                                                               PLAN:                                                                                                                                                     1. Continue PT but increase two 3 times per week due to my concerns of patient getting frozen shoulder. Continue aggressive HEP as discussed. I explained the importance of this to him.  Urged him to switch to [facility] for PT to ensure he is getting proper PT but he refuses.   2. Emphasized scapular function.  3. I have discussed return to activity in detail.  4. he will see us back in 6 weeks.                                      5. All questions were answered and he should contact us if he has any questions or concerns in the interim.                                                         mobic

## 2019-06-14 RX ORDER — SILDENAFIL CITRATE 20 MG/1
TABLET ORAL
Qty: 20 TABLET | Refills: 11 | Status: CANCELLED | OUTPATIENT
Start: 2019-06-14

## 2021-01-12 ENCOUNTER — CLINICAL SUPPORT (OUTPATIENT)
Dept: URGENT CARE | Facility: CLINIC | Age: 56
End: 2021-01-12
Payer: COMMERCIAL

## 2021-01-12 DIAGNOSIS — U07.1 COVID-19 VIRUS DETECTED: ICD-10-CM

## 2021-01-12 DIAGNOSIS — Z11.59 SCREENING FOR VIRAL DISEASE: Primary | ICD-10-CM

## 2021-01-12 LAB
CTP QC/QA: YES
SARS-COV-2 RDRP RESP QL NAA+PROBE: POSITIVE

## 2021-01-12 PROCEDURE — U0002: ICD-10-PCS | Mod: QW,S$GLB,, | Performed by: PHYSICIAN ASSISTANT

## 2021-01-12 PROCEDURE — U0002 COVID-19 LAB TEST NON-CDC: HCPCS | Mod: QW,S$GLB,, | Performed by: PHYSICIAN ASSISTANT

## 2021-07-18 ENCOUNTER — HOSPITAL ENCOUNTER (EMERGENCY)
Facility: HOSPITAL | Age: 56
Discharge: HOME | End: 2021-07-18
Attending: EMERGENCY MEDICINE
Payer: COMMERCIAL

## 2021-07-18 VITALS
HEART RATE: 82 BPM | OXYGEN SATURATION: 97 % | TEMPERATURE: 98 F | WEIGHT: 270 LBS | SYSTOLIC BLOOD PRESSURE: 129 MMHG | RESPIRATION RATE: 18 BRPM | BODY MASS INDEX: 38.65 KG/M2 | HEIGHT: 70 IN | DIASTOLIC BLOOD PRESSURE: 65 MMHG

## 2021-07-18 DIAGNOSIS — R73.9 HYPERGLYCEMIA: ICD-10-CM

## 2021-07-18 DIAGNOSIS — R10.9 ABDOMINAL PAIN: ICD-10-CM

## 2021-07-18 DIAGNOSIS — R10.9 FLANK PAIN: ICD-10-CM

## 2021-07-18 DIAGNOSIS — R10.12 LEFT UPPER QUADRANT ABDOMINAL PAIN: Primary | ICD-10-CM

## 2021-07-18 LAB
ALBUMIN SERPL BCP-MCNC: 4 G/DL (ref 3.5–5.2)
ALP SERPL-CCNC: 73 U/L (ref 55–135)
ALT SERPL W/O P-5'-P-CCNC: 25 U/L (ref 10–44)
ANION GAP SERPL CALC-SCNC: 11 MMOL/L (ref 8–16)
AST SERPL-CCNC: 13 U/L (ref 10–40)
BACTERIA #/AREA URNS HPF: NORMAL /HPF
BASOPHILS # BLD AUTO: 0.02 K/UL (ref 0–0.2)
BASOPHILS NFR BLD: 0.2 % (ref 0–1.9)
BILIRUB SERPL-MCNC: 0.7 MG/DL (ref 0.1–1)
BILIRUB UR QL STRIP: NEGATIVE
BUN SERPL-MCNC: 28 MG/DL (ref 6–20)
CALCIUM SERPL-MCNC: 9.7 MG/DL (ref 8.7–10.5)
CHLORIDE SERPL-SCNC: 104 MMOL/L (ref 95–110)
CLARITY UR: CLEAR
CO2 SERPL-SCNC: 21 MMOL/L (ref 23–29)
COLOR UR: COLORLESS
CREAT SERPL-MCNC: 1.5 MG/DL (ref 0.5–1.4)
DIFFERENTIAL METHOD: ABNORMAL
EOSINOPHIL # BLD AUTO: 0.1 K/UL (ref 0–0.5)
EOSINOPHIL NFR BLD: 0.6 % (ref 0–8)
ERYTHROCYTE [DISTWIDTH] IN BLOOD BY AUTOMATED COUNT: 13.7 % (ref 11.5–14.5)
EST. GFR  (AFRICAN AMERICAN): >60 ML/MIN/1.73 M^2
EST. GFR  (NON AFRICAN AMERICAN): 52 ML/MIN/1.73 M^2
GLUCOSE SERPL-MCNC: 309 MG/DL (ref 70–110)
GLUCOSE UR QL STRIP: ABNORMAL
HCT VFR BLD AUTO: 46.7 % (ref 40–54)
HGB BLD-MCNC: 15.9 G/DL (ref 14–18)
HGB UR QL STRIP: NEGATIVE
IMM GRANULOCYTES # BLD AUTO: 0.05 K/UL (ref 0–0.04)
IMM GRANULOCYTES NFR BLD AUTO: 0.5 % (ref 0–0.5)
KETONES UR QL STRIP: NEGATIVE
LEUKOCYTE ESTERASE UR QL STRIP: NEGATIVE
LIPASE SERPL-CCNC: 143 U/L (ref 4–60)
LYMPHOCYTES # BLD AUTO: 1.4 K/UL (ref 1–4.8)
LYMPHOCYTES NFR BLD: 14 % (ref 18–48)
MCH RBC QN AUTO: 27.5 PG (ref 27–31)
MCHC RBC AUTO-ENTMCNC: 34 G/DL (ref 32–36)
MCV RBC AUTO: 81 FL (ref 82–98)
MICROSCOPIC COMMENT: NORMAL
MONOCYTES # BLD AUTO: 0.7 K/UL (ref 0.3–1)
MONOCYTES NFR BLD: 7 % (ref 4–15)
NEUTROPHILS # BLD AUTO: 7.5 K/UL (ref 1.8–7.7)
NEUTROPHILS NFR BLD: 77.7 % (ref 38–73)
NITRITE UR QL STRIP: NEGATIVE
NRBC BLD-RTO: 0 /100 WBC
PH UR STRIP: 6 [PH] (ref 5–8)
PLATELET # BLD AUTO: 209 K/UL (ref 150–450)
PMV BLD AUTO: 9.6 FL (ref 9.2–12.9)
POCT GLUCOSE: 208 MG/DL (ref 70–110)
POCT GLUCOSE: 240 MG/DL (ref 70–110)
POTASSIUM SERPL-SCNC: 3.9 MMOL/L (ref 3.5–5.1)
PROT SERPL-MCNC: 7.3 G/DL (ref 6–8.4)
PROT UR QL STRIP: NEGATIVE
RBC # BLD AUTO: 5.79 M/UL (ref 4.6–6.2)
SODIUM SERPL-SCNC: 136 MMOL/L (ref 136–145)
SP GR UR STRIP: >1.03 (ref 1–1.03)
TROPONIN I SERPL DL<=0.01 NG/ML-MCNC: 0.02 NG/ML (ref 0–0.03)
URN SPEC COLLECT METH UR: ABNORMAL
UROBILINOGEN UR STRIP-ACNC: NEGATIVE EU/DL
WBC # BLD AUTO: 9.65 K/UL (ref 3.9–12.7)
YEAST URNS QL MICRO: NORMAL

## 2021-07-18 PROCEDURE — 96375 TX/PRO/DX INJ NEW DRUG ADDON: CPT

## 2021-07-18 PROCEDURE — 85025 COMPLETE CBC W/AUTO DIFF WBC: CPT | Performed by: EMERGENCY MEDICINE

## 2021-07-18 PROCEDURE — 96361 HYDRATE IV INFUSION ADD-ON: CPT

## 2021-07-18 PROCEDURE — 81000 URINALYSIS NONAUTO W/SCOPE: CPT | Performed by: EMERGENCY MEDICINE

## 2021-07-18 PROCEDURE — 63600175 PHARM REV CODE 636 W HCPCS: Performed by: EMERGENCY MEDICINE

## 2021-07-18 PROCEDURE — 80053 COMPREHEN METABOLIC PANEL: CPT | Performed by: EMERGENCY MEDICINE

## 2021-07-18 PROCEDURE — 25000003 PHARM REV CODE 250: Performed by: EMERGENCY MEDICINE

## 2021-07-18 PROCEDURE — 83690 ASSAY OF LIPASE: CPT | Performed by: EMERGENCY MEDICINE

## 2021-07-18 PROCEDURE — 99285 EMERGENCY DEPT VISIT HI MDM: CPT | Mod: 25

## 2021-07-18 PROCEDURE — 84484 ASSAY OF TROPONIN QUANT: CPT | Performed by: EMERGENCY MEDICINE

## 2021-07-18 PROCEDURE — 96365 THER/PROPH/DIAG IV INF INIT: CPT

## 2021-07-18 PROCEDURE — 93010 ELECTROCARDIOGRAM REPORT: CPT | Mod: ,,, | Performed by: INTERNAL MEDICINE

## 2021-07-18 PROCEDURE — 93005 ELECTROCARDIOGRAM TRACING: CPT

## 2021-07-18 PROCEDURE — 93010 EKG 12-LEAD: ICD-10-PCS | Mod: ,,, | Performed by: INTERNAL MEDICINE

## 2021-07-18 PROCEDURE — 82962 GLUCOSE BLOOD TEST: CPT | Mod: 59

## 2021-07-18 RX ORDER — HYDROCODONE BITARTRATE AND ACETAMINOPHEN 10; 325 MG/1; MG/1
1 TABLET ORAL
Status: COMPLETED | OUTPATIENT
Start: 2021-07-18 | End: 2021-07-18

## 2021-07-18 RX ORDER — LACTULOSE 10 G/15ML
10 SOLUTION ORAL 2 TIMES DAILY PRN
Qty: 150 ML | Refills: 0 | Status: SHIPPED | OUTPATIENT
Start: 2021-07-18 | End: 2022-10-07

## 2021-07-18 RX ORDER — KETOROLAC TROMETHAMINE 30 MG/ML
15 INJECTION, SOLUTION INTRAMUSCULAR; INTRAVENOUS
Status: COMPLETED | OUTPATIENT
Start: 2021-07-18 | End: 2021-07-18

## 2021-07-18 RX ORDER — PROCHLORPERAZINE EDISYLATE 5 MG/ML
10 INJECTION INTRAMUSCULAR; INTRAVENOUS
Status: COMPLETED | OUTPATIENT
Start: 2021-07-18 | End: 2021-07-18

## 2021-07-18 RX ORDER — METOCLOPRAMIDE 10 MG/1
10 TABLET ORAL EVERY 6 HOURS PRN
Qty: 14 TABLET | Refills: 0 | Status: SHIPPED | OUTPATIENT
Start: 2021-07-18 | End: 2022-10-07

## 2021-07-18 RX ORDER — HYDROCODONE BITARTRATE AND ACETAMINOPHEN 7.5; 325 MG/1; MG/1
1 TABLET ORAL EVERY 6 HOURS PRN
Qty: 12 TABLET | Refills: 0 | Status: SHIPPED | OUTPATIENT
Start: 2021-07-18 | End: 2022-10-07

## 2021-07-18 RX ADMIN — PROCHLORPERAZINE EDISYLATE 10 MG: 5 INJECTION INTRAMUSCULAR; INTRAVENOUS at 04:07

## 2021-07-18 RX ADMIN — INSULIN HUMAN 2 UNITS: 100 INJECTION, SOLUTION PARENTERAL at 05:07

## 2021-07-18 RX ADMIN — HYDROCODONE BITARTRATE AND ACETAMINOPHEN 1 TABLET: 10; 325 TABLET ORAL at 05:07

## 2021-07-18 RX ADMIN — LIDOCAINE HYDROCHLORIDE: 20 SOLUTION ORAL; TOPICAL at 05:07

## 2021-07-18 RX ADMIN — SODIUM CHLORIDE 1000 ML: 0.9 INJECTION, SOLUTION INTRAVENOUS at 04:07

## 2021-07-18 RX ADMIN — KETOROLAC TROMETHAMINE 15 MG: 30 INJECTION, SOLUTION INTRAMUSCULAR; INTRAVENOUS at 04:07

## 2021-11-23 NOTE — TELEPHONE ENCOUNTER
----- Message from Taryn Hedrick sent at 1/22/2019 11:00 AM CST -----  Contact: Delmy @ [facility]  Delym requesting patients current return to work status.    [phone]   Ketoconazole Pregnancy And Lactation Text: This medication is Pregnancy Category C and it isn't know if it is safe during pregnancy. It is also excreted in breast milk and breast feeding isn't recommended.

## 2022-08-03 DIAGNOSIS — E78.2 MIXED HYPERLIPIDEMIA: Primary | ICD-10-CM

## 2022-08-03 DIAGNOSIS — R31.0 GROSS HEMATURIA: ICD-10-CM

## 2022-08-03 DIAGNOSIS — R10.9 RT FLANK PAIN: ICD-10-CM

## 2022-08-03 DIAGNOSIS — N20.0 KIDNEY STONES: ICD-10-CM

## 2022-08-03 DIAGNOSIS — N20.0 NEPHROLITHIASIS: ICD-10-CM

## 2022-08-03 DIAGNOSIS — E11.69 DIABETES MELLITUS TYPE 2 IN OBESE: ICD-10-CM

## 2022-08-03 DIAGNOSIS — E66.9 DIABETES MELLITUS TYPE 2 IN OBESE: ICD-10-CM

## 2022-08-09 ENCOUNTER — LAB VISIT (OUTPATIENT)
Dept: LAB | Facility: HOSPITAL | Age: 57
End: 2022-08-09
Attending: INTERNAL MEDICINE
Payer: COMMERCIAL

## 2022-08-09 DIAGNOSIS — R31.0 GROSS HEMATURIA: ICD-10-CM

## 2022-08-09 DIAGNOSIS — E66.9 DIABETES MELLITUS TYPE 2 IN OBESE: ICD-10-CM

## 2022-08-09 DIAGNOSIS — N20.0 KIDNEY STONES: ICD-10-CM

## 2022-08-09 DIAGNOSIS — R10.9 RT FLANK PAIN: ICD-10-CM

## 2022-08-09 DIAGNOSIS — E78.2 MIXED HYPERLIPIDEMIA: ICD-10-CM

## 2022-08-09 DIAGNOSIS — N20.0 NEPHROLITHIASIS: ICD-10-CM

## 2022-08-09 DIAGNOSIS — E11.69 DIABETES MELLITUS TYPE 2 IN OBESE: ICD-10-CM

## 2022-08-09 LAB
ALBUMIN SERPL BCP-MCNC: 3.9 G/DL (ref 3.5–5.2)
ALP SERPL-CCNC: 74 U/L (ref 55–135)
ALT SERPL W/O P-5'-P-CCNC: 27 U/L (ref 10–44)
ANION GAP SERPL CALC-SCNC: 10 MMOL/L (ref 8–16)
ANISOCYTOSIS BLD QL SMEAR: SLIGHT
AST SERPL-CCNC: 19 U/L (ref 10–40)
BASOPHILS NFR BLD: 0 % (ref 0–1.9)
BILIRUB SERPL-MCNC: 0.6 MG/DL (ref 0.1–1)
BUN SERPL-MCNC: 30 MG/DL (ref 6–20)
CALCIUM SERPL-MCNC: 9 MG/DL (ref 8.7–10.5)
CHLORIDE SERPL-SCNC: 103 MMOL/L (ref 95–110)
CO2 SERPL-SCNC: 22 MMOL/L (ref 23–29)
CREAT SERPL-MCNC: 1.9 MG/DL (ref 0.5–1.4)
DIFFERENTIAL METHOD: NORMAL
EOSINOPHIL NFR BLD: 0 % (ref 0–8)
ERYTHROCYTE [DISTWIDTH] IN BLOOD BY AUTOMATED COUNT: 13.6 % (ref 11.5–14.5)
EST. GFR  (NO RACE VARIABLE): 41 ML/MIN/1.73 M^2
GLUCOSE SERPL-MCNC: 392 MG/DL (ref 70–110)
HCT VFR BLD AUTO: 46 % (ref 40–54)
HGB BLD-MCNC: 15.4 G/DL (ref 14–18)
IMM GRANULOCYTES # BLD AUTO: NORMAL K/UL (ref 0–0.04)
IMM GRANULOCYTES NFR BLD AUTO: NORMAL % (ref 0–0.5)
LYMPHOCYTES NFR BLD: 27 % (ref 18–48)
MAGNESIUM SERPL-MCNC: 2.1 MG/DL (ref 1.6–2.6)
MCH RBC QN AUTO: 27.8 PG (ref 27–31)
MCHC RBC AUTO-ENTMCNC: 33.5 G/DL (ref 32–36)
MCV RBC AUTO: 83 FL (ref 82–98)
MONOCYTES NFR BLD: 4 % (ref 4–15)
MYELOCYTES NFR BLD MANUAL: 1 %
NEUTROPHILS NFR BLD: 64 % (ref 38–73)
NEUTS BAND NFR BLD MANUAL: 4 %
NRBC BLD-RTO: 0 /100 WBC
PHOSPHATE SERPL-MCNC: 3.2 MG/DL (ref 2.7–4.5)
PLATELET # BLD AUTO: 197 K/UL (ref 150–450)
PLATELET BLD QL SMEAR: NORMAL
PMV BLD AUTO: 9.3 FL (ref 9.2–12.9)
POLYCHROMASIA BLD QL SMEAR: NORMAL
POTASSIUM SERPL-SCNC: 4.3 MMOL/L (ref 3.5–5.1)
PROT SERPL-MCNC: 7.3 G/DL (ref 6–8.4)
RBC # BLD AUTO: 5.53 M/UL (ref 4.6–6.2)
SODIUM SERPL-SCNC: 135 MMOL/L (ref 136–145)
URATE SERPL-MCNC: 5.5 MG/DL (ref 3.4–7)
WBC # BLD AUTO: 4.78 K/UL (ref 3.9–12.7)

## 2022-08-09 PROCEDURE — 83735 ASSAY OF MAGNESIUM: CPT | Performed by: INTERNAL MEDICINE

## 2022-08-09 PROCEDURE — 84100 ASSAY OF PHOSPHORUS: CPT | Performed by: INTERNAL MEDICINE

## 2022-08-09 PROCEDURE — 36415 COLL VENOUS BLD VENIPUNCTURE: CPT | Performed by: INTERNAL MEDICINE

## 2022-08-09 PROCEDURE — 85027 COMPLETE CBC AUTOMATED: CPT | Performed by: INTERNAL MEDICINE

## 2022-08-09 PROCEDURE — 80053 COMPREHEN METABOLIC PANEL: CPT | Performed by: INTERNAL MEDICINE

## 2022-08-09 PROCEDURE — 85007 BL SMEAR W/DIFF WBC COUNT: CPT | Performed by: INTERNAL MEDICINE

## 2022-08-09 PROCEDURE — 84550 ASSAY OF BLOOD/URIC ACID: CPT | Performed by: INTERNAL MEDICINE

## 2022-08-11 ENCOUNTER — OFFICE VISIT (OUTPATIENT)
Dept: NEPHROLOGY | Facility: CLINIC | Age: 57
End: 2022-08-11
Payer: COMMERCIAL

## 2022-08-11 VITALS
RESPIRATION RATE: 18 BRPM | WEIGHT: 263.25 LBS | BODY MASS INDEX: 37.69 KG/M2 | SYSTOLIC BLOOD PRESSURE: 122 MMHG | HEART RATE: 85 BPM | HEIGHT: 70 IN | DIASTOLIC BLOOD PRESSURE: 74 MMHG

## 2022-08-11 DIAGNOSIS — E78.2 MIXED HYPERLIPIDEMIA: ICD-10-CM

## 2022-08-11 DIAGNOSIS — E11.8 DIABETES MELLITUS TYPE 2 WITH COMPLICATIONS: ICD-10-CM

## 2022-08-11 DIAGNOSIS — I10 PRIMARY HYPERTENSION: ICD-10-CM

## 2022-08-11 DIAGNOSIS — N18.31 STAGE 3A CHRONIC KIDNEY DISEASE: Primary | ICD-10-CM

## 2022-08-11 DIAGNOSIS — N20.0 URIC ACID NEPHROLITHIASIS: ICD-10-CM

## 2022-08-11 PROCEDURE — 99213 PR OFFICE/OUTPT VISIT, EST, LEVL III, 20-29 MIN: ICD-10-PCS | Mod: S$GLB,,, | Performed by: INTERNAL MEDICINE

## 2022-08-11 PROCEDURE — 3074F SYST BP LT 130 MM HG: CPT | Mod: CPTII,S$GLB,, | Performed by: INTERNAL MEDICINE

## 2022-08-11 PROCEDURE — 99999 PR PBB SHADOW E&M-EST. PATIENT-LVL III: ICD-10-PCS | Mod: PBBFAC,,, | Performed by: INTERNAL MEDICINE

## 2022-08-11 PROCEDURE — 3008F BODY MASS INDEX DOCD: CPT | Mod: CPTII,S$GLB,, | Performed by: INTERNAL MEDICINE

## 2022-08-11 PROCEDURE — 3074F PR MOST RECENT SYSTOLIC BLOOD PRESSURE < 130 MM HG: ICD-10-PCS | Mod: CPTII,S$GLB,, | Performed by: INTERNAL MEDICINE

## 2022-08-11 PROCEDURE — 3066F PR DOCUMENTATION OF TREATMENT FOR NEPHROPATHY: ICD-10-PCS | Mod: CPTII,S$GLB,, | Performed by: INTERNAL MEDICINE

## 2022-08-11 PROCEDURE — 3078F DIAST BP <80 MM HG: CPT | Mod: CPTII,S$GLB,, | Performed by: INTERNAL MEDICINE

## 2022-08-11 PROCEDURE — 3078F PR MOST RECENT DIASTOLIC BLOOD PRESSURE < 80 MM HG: ICD-10-PCS | Mod: CPTII,S$GLB,, | Performed by: INTERNAL MEDICINE

## 2022-08-11 PROCEDURE — 4010F PR ACE/ARB THEARPY RXD/TAKEN: ICD-10-PCS | Mod: CPTII,S$GLB,, | Performed by: INTERNAL MEDICINE

## 2022-08-11 PROCEDURE — 99999 PR PBB SHADOW E&M-EST. PATIENT-LVL III: CPT | Mod: PBBFAC,,, | Performed by: INTERNAL MEDICINE

## 2022-08-11 PROCEDURE — 3008F PR BODY MASS INDEX (BMI) DOCUMENTED: ICD-10-PCS | Mod: CPTII,S$GLB,, | Performed by: INTERNAL MEDICINE

## 2022-08-11 PROCEDURE — 99213 OFFICE O/P EST LOW 20 MIN: CPT | Mod: S$GLB,,, | Performed by: INTERNAL MEDICINE

## 2022-08-11 PROCEDURE — 3066F NEPHROPATHY DOC TX: CPT | Mod: CPTII,S$GLB,, | Performed by: INTERNAL MEDICINE

## 2022-08-11 PROCEDURE — 4010F ACE/ARB THERAPY RXD/TAKEN: CPT | Mod: CPTII,S$GLB,, | Performed by: INTERNAL MEDICINE
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
## 2022-09-30 ENCOUNTER — HOSPITAL ENCOUNTER (OUTPATIENT)
Dept: RADIOLOGY | Facility: HOSPITAL | Age: 57
Discharge: HOME | End: 2022-09-30
Attending: UROLOGY
Payer: COMMERCIAL

## 2022-09-30 ENCOUNTER — OFFICE VISIT (OUTPATIENT)
Dept: UROLOGY | Facility: CLINIC | Age: 57
End: 2022-09-30
Payer: COMMERCIAL

## 2022-09-30 VITALS
TEMPERATURE: 83 F | SYSTOLIC BLOOD PRESSURE: 131 MMHG | WEIGHT: 255.38 LBS | BODY MASS INDEX: 36.56 KG/M2 | HEIGHT: 70 IN | DIASTOLIC BLOOD PRESSURE: 80 MMHG

## 2022-09-30 DIAGNOSIS — R10.9 FLANK PAIN: Primary | ICD-10-CM

## 2022-09-30 DIAGNOSIS — N20.0 NEPHROLITHIASIS: ICD-10-CM

## 2022-09-30 DIAGNOSIS — R10.9 FLANK PAIN: ICD-10-CM

## 2022-09-30 LAB
MICROSCOPIC COMMENT: NORMAL
SQUAMOUS #/AREA URNS AUTO: 0 /HPF
WBC #/AREA URNS AUTO: 0 /HPF (ref 0–5)

## 2022-09-30 PROCEDURE — 1160F RVW MEDS BY RX/DR IN RCRD: CPT | Mod: CPTII,S$GLB,, | Performed by: UROLOGY

## 2022-09-30 PROCEDURE — 1159F MED LIST DOCD IN RCRD: CPT | Mod: CPTII,S$GLB,, | Performed by: UROLOGY

## 2022-09-30 PROCEDURE — 87086 URINE CULTURE/COLONY COUNT: CPT | Performed by: UROLOGY

## 2022-09-30 PROCEDURE — 1159F PR MEDICATION LIST DOCUMENTED IN MEDICAL RECORD: ICD-10-PCS | Mod: CPTII,S$GLB,, | Performed by: UROLOGY

## 2022-09-30 PROCEDURE — 81001 URINALYSIS AUTO W/SCOPE: CPT | Performed by: UROLOGY

## 2022-09-30 PROCEDURE — 3079F DIAST BP 80-89 MM HG: CPT | Mod: CPTII,S$GLB,, | Performed by: UROLOGY

## 2022-09-30 PROCEDURE — 3066F NEPHROPATHY DOC TX: CPT | Mod: CPTII,S$GLB,, | Performed by: UROLOGY

## 2022-09-30 PROCEDURE — 3066F PR DOCUMENTATION OF TREATMENT FOR NEPHROPATHY: ICD-10-PCS | Mod: CPTII,S$GLB,, | Performed by: UROLOGY

## 2022-09-30 PROCEDURE — 99204 PR OFFICE/OUTPT VISIT, NEW, LEVL IV, 45-59 MIN: ICD-10-PCS | Mod: S$GLB,,, | Performed by: UROLOGY

## 2022-09-30 PROCEDURE — 4010F PR ACE/ARB THEARPY RXD/TAKEN: ICD-10-PCS | Mod: CPTII,S$GLB,, | Performed by: UROLOGY

## 2022-09-30 PROCEDURE — 99999 PR PBB SHADOW E&M-EST. PATIENT-LVL IV: ICD-10-PCS | Mod: PBBFAC,,, | Performed by: UROLOGY

## 2022-09-30 PROCEDURE — 99999 PR PBB SHADOW E&M-EST. PATIENT-LVL IV: CPT | Mod: PBBFAC,,, | Performed by: UROLOGY

## 2022-09-30 PROCEDURE — 3075F PR MOST RECENT SYSTOLIC BLOOD PRESS GE 130-139MM HG: ICD-10-PCS | Mod: CPTII,S$GLB,, | Performed by: UROLOGY

## 2022-09-30 PROCEDURE — 3075F SYST BP GE 130 - 139MM HG: CPT | Mod: CPTII,S$GLB,, | Performed by: UROLOGY

## 2022-09-30 PROCEDURE — 4010F ACE/ARB THERAPY RXD/TAKEN: CPT | Mod: CPTII,S$GLB,, | Performed by: UROLOGY

## 2022-09-30 PROCEDURE — 3079F PR MOST RECENT DIASTOLIC BLOOD PRESSURE 80-89 MM HG: ICD-10-PCS | Mod: CPTII,S$GLB,, | Performed by: UROLOGY

## 2022-09-30 PROCEDURE — 74176 CT ABD & PELVIS W/O CONTRAST: CPT | Mod: TC

## 2022-09-30 PROCEDURE — 74176 CT ABD & PELVIS W/O CONTRAST: CPT | Mod: 26,,, | Performed by: RADIOLOGY

## 2022-09-30 PROCEDURE — 99204 OFFICE O/P NEW MOD 45 MIN: CPT | Mod: S$GLB,,, | Performed by: UROLOGY

## 2022-09-30 PROCEDURE — 1160F PR REVIEW ALL MEDS BY PRESCRIBER/CLIN PHARMACIST DOCUMENTED: ICD-10-PCS | Mod: CPTII,S$GLB,, | Performed by: UROLOGY

## 2022-09-30 PROCEDURE — 3008F BODY MASS INDEX DOCD: CPT | Mod: CPTII,S$GLB,, | Performed by: UROLOGY

## 2022-09-30 PROCEDURE — 74176 CT ABDOMEN PELVIS WITHOUT CONTRAST: ICD-10-PCS | Mod: 26,,, | Performed by: RADIOLOGY

## 2022-09-30 PROCEDURE — 3008F PR BODY MASS INDEX (BMI) DOCUMENTED: ICD-10-PCS | Mod: CPTII,S$GLB,, | Performed by: UROLOGY

## 2022-09-30 RX ORDER — EMPAGLIFLOZIN AND LINAGLIPTIN 25; 5 MG/1; MG/1
1 TABLET, FILM COATED ORAL EVERY MORNING
COMMUNITY
Start: 2022-07-23 | End: 2022-10-07

## 2022-09-30 RX ORDER — PANTOPRAZOLE SODIUM 40 MG/1
40 TABLET, DELAYED RELEASE ORAL DAILY
COMMUNITY
Start: 2022-05-16 | End: 2022-10-07

## 2022-09-30 NOTE — PROGRESS NOTES
Subjective:       Patient ID: Erick Espinosa is a 57 y.o. male.    Chief Complaint: Nephrolithiasis     This is a 57 y.o.  male patient that is new to me.  The patient was self referred  for right flank pain.  He reports having pain on the right side greater than left but bilateral flank pain over the last few days with feeling diaphoretic and mild nausea.  He denies any emesis.  No dysuria or hematuria.  He states the last time he felt like this was years ago when he had a stone.  He required intervention at that time with what sounds like ureteroscopy after stent placement.  He reports his stones were made of uric acid at that time and he has been on allopurinol with no issues since.    I personally reviewed the images:  CT 07/2021 with no stones    LAST PSA   Lab Results   Component Value Date    PSA 0.55 05/21/2018    PSA 0.27 02/08/2010       Lab Results   Component Value Date    CREATININE 1.9 (H) 08/09/2022       ---  PMH/PSH/Medications/Allergies/Social history reviewed and as in chart.    Review of Systems   Constitutional:  Positive for diaphoresis and fatigue. Negative for activity change, chills and fever.   HENT:  Negative for congestion.    Respiratory:  Negative for cough, chest tightness and shortness of breath.    Cardiovascular:  Negative for chest pain and palpitations.   Gastrointestinal:  Positive for nausea. Negative for abdominal distention, abdominal pain and vomiting.   Genitourinary:  Positive for flank pain. Negative for difficulty urinating, hematuria, penile pain, scrotal swelling and testicular pain.   Musculoskeletal:  Negative for gait problem.     Objective:      Physical Exam  HENT:      Head: Atraumatic.   Pulmonary:      Effort: Pulmonary effort is normal.   Neurological:      General: No focal deficit present.      Mental Status: He is alert and oriented to person, place, and time.       Assessment:     Problem Noted   Flank Pain 9/30/2022    Feeling poorly, history of stones      Nephrolithiasis 6/5/2013    History of stones requiring intervention in the past.  Per report uric acid stones has been on allopurinol for years.         Plan:     CT scan abdomen pelvis without contrast to evaluate for stones.  Urinalysis and urine culture.  If intractable pain, nausea and vomiting limiting PO intake, fever needs to go to ED.    Follow up based on results    Aleksey Garcia MD

## 2022-10-02 LAB — BACTERIA UR CULT: NO GROWTH

## 2022-10-03 ENCOUNTER — TELEPHONE (OUTPATIENT)
Dept: UROLOGY | Facility: CLINIC | Age: 57
End: 2022-10-03
Payer: COMMERCIAL

## 2022-10-03 NOTE — TELEPHONE ENCOUNTER
----- Message from Aleksey Garcia MD sent at 10/3/2022  8:03 AM CDT -----  Left kidney is small (atrophic), this is stable from previous CT 7/2021.  There are no stones in the kidneys or ureters.  There is no urologic source of your symptoms on this CT scan.  Recommend follow up to review.

## 2022-10-03 NOTE — TELEPHONE ENCOUNTER
----- Message from Aleksey Garcia MD sent at 10/3/2022  8:05 AM CDT -----  Your urine culture was negative.  No urologic reason for you pain.

## 2022-10-03 NOTE — TELEPHONE ENCOUNTER
Spoke with the patient to notify that his results are in and  would like to follow up to discuss. Patient voiced his understanding by confirming with a date/time on 10/07 at 9:40 am.

## 2022-10-07 ENCOUNTER — OFFICE VISIT (OUTPATIENT)
Dept: UROLOGY | Facility: CLINIC | Age: 57
End: 2022-10-07
Payer: COMMERCIAL

## 2022-10-07 VITALS
WEIGHT: 255.5 LBS | HEART RATE: 78 BPM | SYSTOLIC BLOOD PRESSURE: 120 MMHG | DIASTOLIC BLOOD PRESSURE: 80 MMHG | BODY MASS INDEX: 36.58 KG/M2 | HEIGHT: 70 IN

## 2022-10-07 DIAGNOSIS — Z87.442 HISTORY OF KIDNEY STONES: ICD-10-CM

## 2022-10-07 DIAGNOSIS — R39.198 SLOW URINARY STREAM: Primary | ICD-10-CM

## 2022-10-07 DIAGNOSIS — R10.9 FLANK PAIN: ICD-10-CM

## 2022-10-07 DIAGNOSIS — N52.9 ERECTILE DYSFUNCTION, UNSPECIFIED ERECTILE DYSFUNCTION TYPE: ICD-10-CM

## 2022-10-07 PROCEDURE — 1160F RVW MEDS BY RX/DR IN RCRD: CPT | Mod: CPTII,S$GLB,, | Performed by: UROLOGY

## 2022-10-07 PROCEDURE — 3074F PR MOST RECENT SYSTOLIC BLOOD PRESSURE < 130 MM HG: ICD-10-PCS | Mod: CPTII,S$GLB,, | Performed by: UROLOGY

## 2022-10-07 PROCEDURE — 3066F NEPHROPATHY DOC TX: CPT | Mod: CPTII,S$GLB,, | Performed by: UROLOGY

## 2022-10-07 PROCEDURE — 3079F DIAST BP 80-89 MM HG: CPT | Mod: CPTII,S$GLB,, | Performed by: UROLOGY

## 2022-10-07 PROCEDURE — 3074F SYST BP LT 130 MM HG: CPT | Mod: CPTII,S$GLB,, | Performed by: UROLOGY

## 2022-10-07 PROCEDURE — 1159F MED LIST DOCD IN RCRD: CPT | Mod: CPTII,S$GLB,, | Performed by: UROLOGY

## 2022-10-07 PROCEDURE — 4010F ACE/ARB THERAPY RXD/TAKEN: CPT | Mod: CPTII,S$GLB,, | Performed by: UROLOGY

## 2022-10-07 PROCEDURE — 1160F PR REVIEW ALL MEDS BY PRESCRIBER/CLIN PHARMACIST DOCUMENTED: ICD-10-PCS | Mod: CPTII,S$GLB,, | Performed by: UROLOGY

## 2022-10-07 PROCEDURE — 99999 PR PBB SHADOW E&M-EST. PATIENT-LVL III: CPT | Mod: PBBFAC,,, | Performed by: UROLOGY

## 2022-10-07 PROCEDURE — 3079F PR MOST RECENT DIASTOLIC BLOOD PRESSURE 80-89 MM HG: ICD-10-PCS | Mod: CPTII,S$GLB,, | Performed by: UROLOGY

## 2022-10-07 PROCEDURE — 99214 OFFICE O/P EST MOD 30 MIN: CPT | Mod: S$GLB,,, | Performed by: UROLOGY

## 2022-10-07 PROCEDURE — 99214 PR OFFICE/OUTPT VISIT, EST, LEVL IV, 30-39 MIN: ICD-10-PCS | Mod: S$GLB,,, | Performed by: UROLOGY

## 2022-10-07 PROCEDURE — 99999 PR PBB SHADOW E&M-EST. PATIENT-LVL III: ICD-10-PCS | Mod: PBBFAC,,, | Performed by: UROLOGY

## 2022-10-07 PROCEDURE — 3008F PR BODY MASS INDEX (BMI) DOCUMENTED: ICD-10-PCS | Mod: CPTII,S$GLB,, | Performed by: UROLOGY

## 2022-10-07 PROCEDURE — 3066F PR DOCUMENTATION OF TREATMENT FOR NEPHROPATHY: ICD-10-PCS | Mod: CPTII,S$GLB,, | Performed by: UROLOGY

## 2022-10-07 PROCEDURE — 4010F PR ACE/ARB THEARPY RXD/TAKEN: ICD-10-PCS | Mod: CPTII,S$GLB,, | Performed by: UROLOGY

## 2022-10-07 PROCEDURE — 1159F PR MEDICATION LIST DOCUMENTED IN MEDICAL RECORD: ICD-10-PCS | Mod: CPTII,S$GLB,, | Performed by: UROLOGY

## 2022-10-07 PROCEDURE — 3008F BODY MASS INDEX DOCD: CPT | Mod: CPTII,S$GLB,, | Performed by: UROLOGY

## 2022-10-07 RX ORDER — ONDANSETRON 4 MG/1
4 TABLET, ORALLY DISINTEGRATING ORAL EVERY 6 HOURS PRN
COMMUNITY
Start: 2022-09-24

## 2022-10-07 RX ORDER — INSULIN LISPRO 100 [IU]/ML
INJECTION, SOLUTION INTRAVENOUS; SUBCUTANEOUS
COMMUNITY
Start: 2022-10-06

## 2022-10-07 RX ORDER — TAMSULOSIN HYDROCHLORIDE 0.4 MG/1
0.4 CAPSULE ORAL DAILY
Qty: 30 CAPSULE | Refills: 11 | Status: SHIPPED | OUTPATIENT
Start: 2022-10-07 | End: 2023-06-08

## 2022-10-07 RX ORDER — SILDENAFIL 100 MG/1
100 TABLET, FILM COATED ORAL DAILY PRN
Qty: 6 TABLET | Refills: 11 | Status: SHIPPED | OUTPATIENT
Start: 2022-10-07 | End: 2023-06-08

## 2022-10-07 RX ORDER — INSULIN GLARGINE 100 [IU]/ML
INJECTION, SOLUTION SUBCUTANEOUS
COMMUNITY
Start: 2022-10-04

## 2022-10-07 NOTE — PROGRESS NOTES
Subjective:       Patient ID: Erick Espinosa is a 57 y.o. male.    Chief Complaint: Follow-up (CT results)     This is a 57 y.o.  male patient with h/o right flank pain.  He reports having pain on the right side greater than left but bilateral flank pain over the last few days with feeling diaphoretic and mild nausea.  He denies any emesis.  No dysuria or hematuria.  He states the last time he felt like this was years ago when he had a stone.  He required intervention at that time with what sounds like ureteroscopy after stent placement.  He reports his stones were made of uric acid at that time and he has been on allopurinol with no issues since.    CT 10/2022 negative for stones or hydronephrosis    I personally reviewed the images:  CT 07/2021 with no stones; CT 10/22 as above    LAST PSA   Lab Results   Component Value Date    PSA 0.55 05/21/2018    PSA 0.27 02/08/2010       Lab Results   Component Value Date    CREATININE 1.9 (H) 08/09/2022       ---  PMH/PSH/Medications/Allergies/Social history reviewed and as in chart.    Review of Systems   Constitutional:  Positive for diaphoresis and fatigue. Negative for activity change, chills and fever.   HENT:  Negative for congestion.    Respiratory:  Negative for cough, chest tightness and shortness of breath.    Cardiovascular:  Negative for chest pain and palpitations.   Gastrointestinal:  Positive for nausea. Negative for abdominal distention, abdominal pain and vomiting.   Genitourinary:  Positive for flank pain. Negative for difficulty urinating, hematuria, penile pain, scrotal swelling and testicular pain.   Musculoskeletal:  Negative for gait problem.     Objective:      Physical Exam  HENT:      Head: Atraumatic.   Pulmonary:      Effort: Pulmonary effort is normal.   Neurological:      General: No focal deficit present.      Mental Status: He is alert and oriented to person, place, and time.       Results for orders placed or performed during the hospital  encounter of 09/30/22 (from the past 2160 hour(s))   CT Abdomen Pelvis  Without Contrast    Impression    Atrophic left kidney with compensatory hypertrophic right renal changes.    No evidence of  tract stone obstruction.    No acute findings evident within the abdomen or pelvis.    Resolution stranding.      Electronically signed by: Mao Leiva  Date:    09/30/2022  Time:    17:42       Assessment:     Problem Noted   Flank Pain 9/30/2022    Feeling poorly, history of stones  CT 10/2022 negative for stones or hydronephrosis     Slow Urinary Stream 10/7/2022    Tamsulosin started 10/22     Erectile Dysfunction 6/5/2013    Sildenafil started 10/22     History of Kidney Stones 7/6/2012    H/o stones         Plan:     CT scan abdomen pelvis without contrast reviewed, no stones or obstruction  No  cause for flank pain; UA and urine culture negative  Having some new lower urinary tract symptoms, start tamsulosin.  Also erectile dysfunction start sildenafil.  Side effects, risks, benefits and alternatives of the medication discussed.        Aleksey Garcia MD

## 2022-12-12 ENCOUNTER — LAB VISIT (OUTPATIENT)
Dept: LAB | Facility: HOSPITAL | Age: 57
End: 2022-12-12
Attending: INTERNAL MEDICINE
Payer: COMMERCIAL

## 2022-12-12 DIAGNOSIS — E78.2 MIXED HYPERLIPIDEMIA: ICD-10-CM

## 2022-12-12 DIAGNOSIS — N20.0 URIC ACID NEPHROLITHIASIS: ICD-10-CM

## 2022-12-12 DIAGNOSIS — N18.31 STAGE 3A CHRONIC KIDNEY DISEASE: ICD-10-CM

## 2022-12-12 DIAGNOSIS — I10 PRIMARY HYPERTENSION: ICD-10-CM

## 2022-12-12 DIAGNOSIS — E11.8 DIABETES MELLITUS TYPE 2 WITH COMPLICATIONS: ICD-10-CM

## 2022-12-12 LAB
ALBUMIN SERPL BCP-MCNC: 3.6 G/DL (ref 3.5–5.2)
ALP SERPL-CCNC: 60 U/L (ref 55–135)
ALT SERPL W/O P-5'-P-CCNC: 24 U/L (ref 10–44)
ANION GAP SERPL CALC-SCNC: 13 MMOL/L (ref 8–16)
AST SERPL-CCNC: 17 U/L (ref 10–40)
BASOPHILS # BLD AUTO: 0.02 K/UL (ref 0–0.2)
BASOPHILS NFR BLD: 0.3 % (ref 0–1.9)
BILIRUB SERPL-MCNC: 1.1 MG/DL (ref 0.1–1)
BUN SERPL-MCNC: 18 MG/DL (ref 6–20)
CALCIUM SERPL-MCNC: 9.3 MG/DL (ref 8.7–10.5)
CHLORIDE SERPL-SCNC: 105 MMOL/L (ref 95–110)
CO2 SERPL-SCNC: 21 MMOL/L (ref 23–29)
CREAT SERPL-MCNC: 1.3 MG/DL (ref 0.5–1.4)
DIFFERENTIAL METHOD: ABNORMAL
EOSINOPHIL # BLD AUTO: 0.1 K/UL (ref 0–0.5)
EOSINOPHIL NFR BLD: 1.1 % (ref 0–8)
ERYTHROCYTE [DISTWIDTH] IN BLOOD BY AUTOMATED COUNT: 13.5 % (ref 11.5–14.5)
EST. GFR  (NO RACE VARIABLE): >60 ML/MIN/1.73 M^2
GLUCOSE SERPL-MCNC: 142 MG/DL (ref 70–110)
HCT VFR BLD AUTO: 42.6 % (ref 40–54)
HGB BLD-MCNC: 14.3 G/DL (ref 14–18)
IMM GRANULOCYTES # BLD AUTO: 0.03 K/UL (ref 0–0.04)
IMM GRANULOCYTES NFR BLD AUTO: 0.5 % (ref 0–0.5)
LYMPHOCYTES # BLD AUTO: 0.8 K/UL (ref 1–4.8)
LYMPHOCYTES NFR BLD: 11.9 % (ref 18–48)
MAGNESIUM SERPL-MCNC: 1.6 MG/DL (ref 1.6–2.6)
MCH RBC QN AUTO: 27.9 PG (ref 27–31)
MCHC RBC AUTO-ENTMCNC: 33.6 G/DL (ref 32–36)
MCV RBC AUTO: 83 FL (ref 82–98)
MONOCYTES # BLD AUTO: 0.4 K/UL (ref 0.3–1)
MONOCYTES NFR BLD: 6.4 % (ref 4–15)
NEUTROPHILS # BLD AUTO: 5.2 K/UL (ref 1.8–7.7)
NEUTROPHILS NFR BLD: 79.8 % (ref 38–73)
NRBC BLD-RTO: 0 /100 WBC
PHOSPHATE SERPL-MCNC: 3.1 MG/DL (ref 2.7–4.5)
PLATELET # BLD AUTO: 181 K/UL (ref 150–450)
PMV BLD AUTO: 9.4 FL (ref 9.2–12.9)
POTASSIUM SERPL-SCNC: 4.1 MMOL/L (ref 3.5–5.1)
PROT SERPL-MCNC: 6.9 G/DL (ref 6–8.4)
RBC # BLD AUTO: 5.12 M/UL (ref 4.6–6.2)
SODIUM SERPL-SCNC: 139 MMOL/L (ref 136–145)
WBC # BLD AUTO: 6.54 K/UL (ref 3.9–12.7)

## 2022-12-12 PROCEDURE — 84100 ASSAY OF PHOSPHORUS: CPT | Performed by: INTERNAL MEDICINE

## 2022-12-12 PROCEDURE — 85025 COMPLETE CBC W/AUTO DIFF WBC: CPT | Performed by: INTERNAL MEDICINE

## 2022-12-12 PROCEDURE — 81003 URINALYSIS AUTO W/O SCOPE: CPT | Performed by: INTERNAL MEDICINE

## 2022-12-12 PROCEDURE — 80053 COMPREHEN METABOLIC PANEL: CPT | Performed by: INTERNAL MEDICINE

## 2022-12-12 PROCEDURE — 36415 COLL VENOUS BLD VENIPUNCTURE: CPT | Performed by: INTERNAL MEDICINE

## 2022-12-12 PROCEDURE — 83735 ASSAY OF MAGNESIUM: CPT | Performed by: INTERNAL MEDICINE

## 2022-12-15 ENCOUNTER — TELEPHONE (OUTPATIENT)
Dept: NEPHROLOGY | Facility: CLINIC | Age: 57
End: 2022-12-15
Payer: COMMERCIAL

## 2022-12-15 NOTE — TELEPHONE ENCOUNTER
Spoke with pt and advised that appt needed to be cancelled/rescheduled to 1/17/23. Pt not able to schedule appts on Tuesday and will have to do a Thursday. Message will be sent to provider to overbook if needed.

## 2023-06-08 ENCOUNTER — LAB VISIT (OUTPATIENT)
Dept: LAB | Facility: HOSPITAL | Age: 58
End: 2023-06-08
Attending: UROLOGY
Payer: COMMERCIAL

## 2023-06-08 ENCOUNTER — OFFICE VISIT (OUTPATIENT)
Dept: UROLOGY | Facility: CLINIC | Age: 58
End: 2023-06-08
Payer: COMMERCIAL

## 2023-06-08 VITALS
SYSTOLIC BLOOD PRESSURE: 128 MMHG | BODY MASS INDEX: 39.67 KG/M2 | HEIGHT: 70 IN | HEART RATE: 82 BPM | WEIGHT: 277.13 LBS | DIASTOLIC BLOOD PRESSURE: 85 MMHG

## 2023-06-08 DIAGNOSIS — R39.198 SLOW URINARY STREAM: ICD-10-CM

## 2023-06-08 DIAGNOSIS — N52.9 ERECTILE DYSFUNCTION, UNSPECIFIED ERECTILE DYSFUNCTION TYPE: Primary | ICD-10-CM

## 2023-06-08 LAB — POC RESIDUAL URINE VOLUME: 136 ML (ref 0–100)

## 2023-06-08 PROCEDURE — 51798 US URINE CAPACITY MEASURE: CPT | Mod: S$GLB,,, | Performed by: UROLOGY

## 2023-06-08 PROCEDURE — 3079F DIAST BP 80-89 MM HG: CPT | Mod: CPTII,S$GLB,, | Performed by: UROLOGY

## 2023-06-08 PROCEDURE — 36415 COLL VENOUS BLD VENIPUNCTURE: CPT | Performed by: UROLOGY

## 2023-06-08 PROCEDURE — 3079F PR MOST RECENT DIASTOLIC BLOOD PRESSURE 80-89 MM HG: ICD-10-PCS | Mod: CPTII,S$GLB,, | Performed by: UROLOGY

## 2023-06-08 PROCEDURE — 3074F SYST BP LT 130 MM HG: CPT | Mod: CPTII,S$GLB,, | Performed by: UROLOGY

## 2023-06-08 PROCEDURE — 3008F PR BODY MASS INDEX (BMI) DOCUMENTED: ICD-10-PCS | Mod: CPTII,S$GLB,, | Performed by: UROLOGY

## 2023-06-08 PROCEDURE — 1160F RVW MEDS BY RX/DR IN RCRD: CPT | Mod: CPTII,S$GLB,, | Performed by: UROLOGY

## 2023-06-08 PROCEDURE — 1160F PR REVIEW ALL MEDS BY PRESCRIBER/CLIN PHARMACIST DOCUMENTED: ICD-10-PCS | Mod: CPTII,S$GLB,, | Performed by: UROLOGY

## 2023-06-08 PROCEDURE — 4010F PR ACE/ARB THEARPY RXD/TAKEN: ICD-10-PCS | Mod: CPTII,S$GLB,, | Performed by: UROLOGY

## 2023-06-08 PROCEDURE — 1159F PR MEDICATION LIST DOCUMENTED IN MEDICAL RECORD: ICD-10-PCS | Mod: CPTII,S$GLB,, | Performed by: UROLOGY

## 2023-06-08 PROCEDURE — 4010F ACE/ARB THERAPY RXD/TAKEN: CPT | Mod: CPTII,S$GLB,, | Performed by: UROLOGY

## 2023-06-08 PROCEDURE — 99214 PR OFFICE/OUTPT VISIT, EST, LEVL IV, 30-39 MIN: ICD-10-PCS | Mod: S$GLB,,, | Performed by: UROLOGY

## 2023-06-08 PROCEDURE — 84153 ASSAY OF PSA TOTAL: CPT | Performed by: UROLOGY

## 2023-06-08 PROCEDURE — 99999 PR PBB SHADOW E&M-EST. PATIENT-LVL IV: CPT | Mod: PBBFAC,,, | Performed by: UROLOGY

## 2023-06-08 PROCEDURE — 51798 POCT BLADDER SCAN: ICD-10-PCS | Mod: S$GLB,,, | Performed by: UROLOGY

## 2023-06-08 PROCEDURE — 99214 OFFICE O/P EST MOD 30 MIN: CPT | Mod: S$GLB,,, | Performed by: UROLOGY

## 2023-06-08 PROCEDURE — 99999 PR PBB SHADOW E&M-EST. PATIENT-LVL IV: ICD-10-PCS | Mod: PBBFAC,,, | Performed by: UROLOGY

## 2023-06-08 PROCEDURE — 1159F MED LIST DOCD IN RCRD: CPT | Mod: CPTII,S$GLB,, | Performed by: UROLOGY

## 2023-06-08 PROCEDURE — 3074F PR MOST RECENT SYSTOLIC BLOOD PRESSURE < 130 MM HG: ICD-10-PCS | Mod: CPTII,S$GLB,, | Performed by: UROLOGY

## 2023-06-08 PROCEDURE — 3008F BODY MASS INDEX DOCD: CPT | Mod: CPTII,S$GLB,, | Performed by: UROLOGY

## 2023-06-08 RX ORDER — TADALAFIL 5 MG/1
5 TABLET ORAL DAILY
Qty: 30 TABLET | Refills: 5 | Status: SHIPPED | OUTPATIENT
Start: 2023-06-08 | End: 2024-06-07

## 2023-06-08 RX ORDER — BLOOD-GLUCOSE SENSOR
EACH MISCELLANEOUS
COMMUNITY
Start: 2023-05-06

## 2023-06-08 RX ORDER — OLIVE OIL
OIL (ML) MISCELLANEOUS
COMMUNITY
Start: 2023-02-23

## 2023-06-08 RX ORDER — SEMAGLUTIDE 0.68 MG/ML
INJECTION, SOLUTION SUBCUTANEOUS
COMMUNITY
Start: 2023-05-08

## 2023-06-08 RX ORDER — PEN NEEDLE, DIABETIC 31 GX5/16"
NEEDLE, DISPOSABLE MISCELLANEOUS
COMMUNITY
Start: 2023-03-12

## 2023-06-08 NOTE — PROGRESS NOTES
Subjective:       Patient ID: Erick Espinosa is a 58 y.o. male.    Chief Complaint: Follow-up       This is a 58 y.o.  male patient with h/o right flank pain.  He reports having pain on the right side greater than left but bilateral flank pain over the last few days with feeling diaphoretic and mild nausea.  He denies any emesis.  No dysuria or hematuria.  He states the last time he felt like this was years ago when he had a stone.  He required intervention at that time with what sounds like ureteroscopy after stent placement.  He reports his stones were made of uric acid at that time and he has been on allopurinol with no issues since.    CT 10/2022 negative for stones or hydronephrosis  Slow stream started tamsulosin 10/22--some help but retrograde ejaculation and stopped.  ED on sildenafil started 10/22--tried some limited help, took past generic cialis that had previously with some help.      Some LUTs AUA SS 10/3.  Not overly bothersome, likely frequency related to DM.       I personally reviewed the images:  CT 07/2021 with no stones; CT 10/22 as above    LAST PSA   Lab Results   Component Value Date    PSA 0.55 05/21/2018    PSA 0.27 02/08/2010       Lab Results   Component Value Date    CREATININE 1.3 12/12/2022       ---  PMH/PSH/Medications/Allergies/Social history reviewed and as in chart.    Review of Systems   Constitutional:  Positive for diaphoresis and fatigue. Negative for activity change, chills and fever.   HENT:  Negative for congestion.    Respiratory:  Negative for cough, chest tightness and shortness of breath.    Cardiovascular:  Negative for chest pain and palpitations.   Gastrointestinal:  Positive for nausea. Negative for abdominal distention, abdominal pain and vomiting.   Genitourinary:  Positive for frequency. Negative for difficulty urinating, flank pain, hematuria, penile pain, scrotal swelling and testicular pain.   Musculoskeletal:  Negative for gait problem.     Objective:       Physical Exam  HENT:      Head: Atraumatic.   Pulmonary:      Effort: Pulmonary effort is normal.   Neurological:      General: No focal deficit present.      Mental Status: He is alert and oriented to person, place, and time.       No results found for this or any previous visit (from the past 2160 hour(s)).      Assessment:     Problem Noted   Flank Pain 9/30/2022    Feeling poorly, history of stones  CT 10/2022 negative for stones or hydronephrosis     Slow Urinary Stream 10/7/2022    Tamsulosin started 10/22     Erectile Dysfunction 6/5/2013    Sildenafil started 10/22  Changed to daily tadalafil 6/23     History of Kidney Stones 7/6/2012    H/o stones         Plan:     Change sildenafil to tadalafil daily  Moderate LUTs stopped flomax due to side effects--to consider restarting, offered cystoscopy wants to hold for now.    Follow up in 6 months      Aleksey Garcia MD

## 2023-06-09 LAB
PROSTATE SPECIFIC ANTIGEN, TOTAL: 0.41 NG/ML (ref 0–4)
PSA FREE MFR SERPL: 60.98 %
PSA FREE SERPL-MCNC: 0.25 NG/ML (ref 0–1.5)

## 2025-06-30 ENCOUNTER — HOSPITAL ENCOUNTER (OUTPATIENT)
Dept: RADIOLOGY | Facility: HOSPITAL | Age: 60
Discharge: HOME | End: 2025-06-30
Attending: ORTHOPAEDIC SURGERY
Payer: COMMERCIAL

## 2025-06-30 ENCOUNTER — OFFICE VISIT (OUTPATIENT)
Dept: SPORTS MEDICINE | Facility: CLINIC | Age: 60
End: 2025-06-30
Payer: COMMERCIAL

## 2025-06-30 VITALS
HEIGHT: 70 IN | HEART RATE: 88 BPM | SYSTOLIC BLOOD PRESSURE: 134 MMHG | BODY MASS INDEX: 36.43 KG/M2 | DIASTOLIC BLOOD PRESSURE: 74 MMHG | WEIGHT: 254.5 LBS

## 2025-06-30 DIAGNOSIS — M75.81 ROTATOR CUFF TENDINITIS, RIGHT: ICD-10-CM

## 2025-06-30 DIAGNOSIS — M25.511 RIGHT SHOULDER PAIN, UNSPECIFIED CHRONICITY: Primary | ICD-10-CM

## 2025-06-30 DIAGNOSIS — M75.41 SHOULDER IMPINGEMENT SYNDROME, RIGHT: ICD-10-CM

## 2025-06-30 DIAGNOSIS — M25.511 RIGHT SHOULDER PAIN, UNSPECIFIED CHRONICITY: ICD-10-CM

## 2025-06-30 PROCEDURE — 73030 X-RAY EXAM OF SHOULDER: CPT | Mod: 26,RT,, | Performed by: RADIOLOGY

## 2025-06-30 PROCEDURE — 3008F BODY MASS INDEX DOCD: CPT | Mod: CPTII,S$GLB,, | Performed by: ORTHOPAEDIC SURGERY

## 2025-06-30 PROCEDURE — 73030 X-RAY EXAM OF SHOULDER: CPT | Mod: TC,RT

## 2025-06-30 PROCEDURE — 4010F ACE/ARB THERAPY RXD/TAKEN: CPT | Mod: CPTII,S$GLB,, | Performed by: ORTHOPAEDIC SURGERY

## 2025-06-30 PROCEDURE — 1159F MED LIST DOCD IN RCRD: CPT | Mod: CPTII,S$GLB,, | Performed by: ORTHOPAEDIC SURGERY

## 2025-06-30 PROCEDURE — 3078F DIAST BP <80 MM HG: CPT | Mod: CPTII,S$GLB,, | Performed by: ORTHOPAEDIC SURGERY

## 2025-06-30 PROCEDURE — 99204 OFFICE O/P NEW MOD 45 MIN: CPT | Mod: S$GLB,,, | Performed by: ORTHOPAEDIC SURGERY

## 2025-06-30 PROCEDURE — 3075F SYST BP GE 130 - 139MM HG: CPT | Mod: CPTII,S$GLB,, | Performed by: ORTHOPAEDIC SURGERY

## 2025-06-30 PROCEDURE — 99999 PR PBB SHADOW E&M-EST. PATIENT-LVL IV: CPT | Mod: PBBFAC,,, | Performed by: ORTHOPAEDIC SURGERY
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
## 2025-07-09 ENCOUNTER — CLINICAL SUPPORT (OUTPATIENT)
Dept: REHABILITATION | Facility: HOSPITAL | Age: 60
End: 2025-07-09
Attending: ORTHOPAEDIC SURGERY
Payer: COMMERCIAL

## 2025-07-09 DIAGNOSIS — R29.898 DECREASED STRENGTH OF UPPER EXTREMITY: Primary | ICD-10-CM

## 2025-07-09 DIAGNOSIS — M75.81 ROTATOR CUFF TENDINITIS, RIGHT: ICD-10-CM

## 2025-07-09 DIAGNOSIS — M75.41 SHOULDER IMPINGEMENT SYNDROME, RIGHT: ICD-10-CM

## 2025-07-09 PROCEDURE — 97161 PT EVAL LOW COMPLEX 20 MIN: CPT | Performed by: PHYSICAL THERAPIST

## 2025-07-09 PROCEDURE — 97530 THERAPEUTIC ACTIVITIES: CPT | Performed by: PHYSICAL THERAPIST

## 2025-07-11 PROBLEM — R29.898 DECREASED STRENGTH OF UPPER EXTREMITY: Status: ACTIVE | Noted: 2025-07-11
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
## 2025-07-14 ENCOUNTER — CLINICAL SUPPORT (OUTPATIENT)
Dept: REHABILITATION | Facility: HOSPITAL | Age: 60
End: 2025-07-14
Payer: COMMERCIAL

## 2025-07-14 DIAGNOSIS — R29.898 DECREASED STRENGTH OF UPPER EXTREMITY: Primary | ICD-10-CM

## 2025-07-14 PROCEDURE — 97140 MANUAL THERAPY 1/> REGIONS: CPT

## 2025-07-14 PROCEDURE — 97112 NEUROMUSCULAR REEDUCATION: CPT

## 2025-07-14 NOTE — PROGRESS NOTES
"  Outpatient Rehab    Physical Therapy Visit    Patient Name: Erick Espinosa  MRN: [id number]  YOB: 1965  Encounter Date: 7/14/2025    Therapy Diagnosis:   Encounter Diagnosis   Name Primary?    Decreased strength of upper extremity Yes     Physician: Malgorzata Hall MD    Physician Orders: Eval and Treat  Medical Diagnosis: Shoulder impingement syndrome, right  Rotator cuff tendinitis, right  Surgical Diagnosis: Not applicable for this Episode   Surgical Date: Not applicable for this Episode  Days Since Last Surgery: Not applicable for this Episode    Visit # / Visits Authorized:  1 / 10  Insurance Authorization Period: 7/2/2025 to 12/31/2025  Date of Evaluation: 7/9/2025  Plan of Care Certification: 7/11/2025 to 10/11/2025      PT/PTA:     Number of PTA visits since last PT visit:   Time In: 0802   Time Out: 0849  Total Time (in minutes): 47   Total Billable Time (in minutes): 47    FOTO:  Intake Score: 46%  Survey Score 2: Not applicable for this Episode%  Survey Score 3: Not applicable for this Episode%    Precautions:         Subjective   Erick reports that he had some increase in pain after the evaluation, but are less today..         Objective            Treatment:  PT extender available to assist with treatment as needed    Manual Therapy  MT 1: Thoracic manipulation  MT 2: Upper cervical rotation MET  MT 3: CTJ gapping grade 3  Balance/Neuromuscular Re-Education  NMR 1: Upper trap 1 3x10  NMR 2: Chin tucks 2x10 5"  NMR 3: HHR 2x10  NMR 4: Supine low trap 1 2x10  NMR 5: ER OTB 3x10    Time Entry(in minutes):  Manual Therapy Time Entry: 20  Neuromuscular Re-Education Time Entry: 27    Assessment & Plan   Assessment: Erick presented with mild symptoms in right upper trap. He improved upper cervical rotation after METs. He fatigued with upper trap exercises on his right quickly. He was limited in pain free range with hand heel rocks.        The patient will continue to benefit from skilled outpatient " physical therapy in order to address the deficits listed in the problem list on the initial evaluation, provide patient and family education, and maximize the patients level of independence in the home and community environments.     The patient's spiritual, cultural, and educational needs were considered, and the patient is agreeable to the plan of care and goals.           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  (Progressing)       Start:  07/11/25    Expected End:  10/10/25            Patient will demonstrate independence in home program for support of progression (Progressing)       Start:  07/11/25    Expected End:  08/11/25               Pain       Patient will report pain of 2/10 demonstrating a reduction of overall pain (Progressing)       Start:  07/11/25    Expected End:  08/11/25               Strength       Patient will achieve right upper trap strength of 4/5 (Progressing)       Start:  07/11/25    Expected End:  10/10/25            Patient will achieve right serratus anterior strength of 4/5 (Progressing)       Start:  07/11/25    Expected End:  10/10/25                Aravind Peterson PT

## 2025-07-18 ENCOUNTER — CLINICAL SUPPORT (OUTPATIENT)
Dept: REHABILITATION | Facility: HOSPITAL | Age: 60
End: 2025-07-18
Payer: COMMERCIAL

## 2025-07-18 DIAGNOSIS — R29.898 DECREASED STRENGTH OF UPPER EXTREMITY: Primary | ICD-10-CM

## 2025-07-18 PROCEDURE — 97112 NEUROMUSCULAR REEDUCATION: CPT | Performed by: PHYSICAL THERAPIST

## 2025-07-18 PROCEDURE — 97140 MANUAL THERAPY 1/> REGIONS: CPT | Performed by: PHYSICAL THERAPIST

## 2025-07-18 NOTE — PROGRESS NOTES
"  Outpatient Rehab    Physical Therapy Visit    Patient Name: Erick Espinosa  MRN: [id number]  YOB: 1965  Encounter Date: 7/18/2025    Therapy Diagnosis:   Encounter Diagnosis   Name Primary?    Decreased strength of upper extremity Yes     Physician: Malgorzata Hall MD    Physician Orders: Eval and Treat  Medical Diagnosis: Shoulder impingement syndrome, right  Rotator cuff tendinitis, right  Surgical Diagnosis: Not applicable for this Episode   Surgical Date: Not applicable for this Episode  Days Since Last Surgery: Not applicable for this Episode    Visit # / Visits Authorized:  2 / 10  Insurance Authorization Period: 7/2/2025 to 12/31/2025  Date of Evaluation: 7/9/2025  Plan of Care Certification: 7/11/2025 to 10/11/2025      PT/PTA:     Number of PTA visits since last PT visit:   Time In: 0703   Time Out: 0800  Total Time (in minutes): 57   Total Billable Time (in minutes): 57    FOTO:  Intake Score: 46%  Survey Score 2: Not applicable for this Episode%  Survey Score 3: Not applicable for this Episode%    Precautions:         Subjective   He has been feeling better this week, just still some soreness into the upper trap on the R but no nerve symptoms down the arm.  Pain reported as 0/10.      Objective            Treatment:  PT extender available to assist with treatment as needed    Manual Therapy  MT 1: Seated CT extensions feet elevated  MT 2: Upper cervical rotation MET  MT 3: CTJ gapping grade 3  Balance/Neuromuscular Re-Education  NMR 1: Upper trap 1 3x10  NMR 2: Quadruped T 2# 3 x 8  NMR 3: HHR 2x10  NMR 4: Quadruped thread the needle 15x B  NMR 5: Prone extensions 2# 2 x 10 3"  NMR 6: DNF with OTB shoulder flexion 3 x 10  NMR 7: Landmine press 15# 3 x 10    Time Entry(in minutes):  Manual Therapy Time Entry: 15  Neuromuscular Re-Education Time Entry: 42    Assessment & Plan   Assessment: Erick reported tenderness over the 1st and 2nd ribs on arrival. Improved with CT mobs and UT activation. " Focus on improving his serratus and low trap strength to reduce UT overdominance with lifting. Noted with landmine tends to shrug and it caused neural symptoms, corrected form for scap protraction and not elevated and that improved his symptoms  Evaluation/Treatment Tolerance: Patient tolerated treatment well    The patient will continue to benefit from skilled outpatient physical therapy in order to address the deficits listed in the problem list on the initial evaluation, provide patient and family education, and maximize the patients level of independence in the home and community environments.     The patient's spiritual, cultural, and educational needs were considered, and the patient is agreeable to the plan of care and goals.           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  (Progressing)       Start:  07/11/25    Expected End:  10/10/25            Patient will demonstrate independence in home program for support of progression (Progressing)       Start:  07/11/25    Expected End:  08/11/25               Pain       Patient will report pain of 2/10 demonstrating a reduction of overall pain (Progressing)       Start:  07/11/25    Expected End:  08/11/25               Strength       Patient will achieve right upper trap strength of 4/5 (Progressing)       Start:  07/11/25    Expected End:  10/10/25            Patient will achieve right serratus anterior strength of 4/5 (Progressing)       Start:  07/11/25    Expected End:  10/10/25                Mark Anthony Torres, PT, DPT

## 2025-07-22 ENCOUNTER — CLINICAL SUPPORT (OUTPATIENT)
Dept: REHABILITATION | Facility: HOSPITAL | Age: 60
End: 2025-07-22
Payer: COMMERCIAL

## 2025-07-22 DIAGNOSIS — R29.898 DECREASED STRENGTH OF UPPER EXTREMITY: Primary | ICD-10-CM

## 2025-07-22 PROCEDURE — 97112 NEUROMUSCULAR REEDUCATION: CPT

## 2025-07-22 PROCEDURE — 97140 MANUAL THERAPY 1/> REGIONS: CPT

## 2025-07-23 NOTE — PROGRESS NOTES
Physical Therapy Visit    Patient Name: Erick Espinosa  MRN: [id number]  YOB: 1965  Encounter Date: 7/22/2025    Therapy Diagnosis:   Encounter Diagnosis   Name Primary?    Decreased strength of upper extremity Yes     Physician: Malgorzata Hall MD    Physician Orders: Eval and Treat  Medical Diagnosis: Shoulder impingement syndrome, right  Rotator cuff tendinitis, right  Surgical Diagnosis: Not applicable for this Episode   Surgical Date: Not applicable for this Episode  Days Since Last Surgery: Not applicable for this Episode    Visit # / Visits Authorized:  3 / 10  Insurance Authorization Period: 7/2/2025 to 12/31/2025  Date of Evaluation: 7/9/2025  Plan of Care Certification: 7/11/2025 to 10/11/2025      PT/PTA:     Number of PTA visits since last PT visit:   Time In: 1400   Time Out: 1500  Total Time (in minutes): 60   Total Billable Time (in minutes): 60    FOTO:  Intake Score (%): 46  Survey Score 2 (%): Not applicable for this Episode  Survey Score 3 (%): Not applicable for this Episode    Precautions:         Subjective   Erick is feeling okay today, but had increased pain after lat visit for about three days..         Objective            Treatment:  Manual Therapy  MT 1: Seated CT extensions feet elevated  MT 2: Upper cervical rotation MET  MT 3: CTJ gapping grade 3  Balance/Neuromuscular Re-Education  NMR 1: Upper trap 1 3x10  NMR 3: HHR 2x10  NMR 4: Quadruped thread the needle 15x B  NMR 6: DNF with OTB shoulder flexion 3 x 10  NMR 7: Landmine press 15# 3 x 10    Time Entry(in minutes):  Manual Therapy Time Entry: 15  Neuromuscular Re-Education Time Entry: 45    Assessment & Plan   Assessment: Erick reported similar neck and right upper trap pain. He continues to have stiffness through upper cervical and CT junction. He noted feeling pain in region of first rib, but tested negative for cervical rotation flexion to assess 1st rib dysfunction. Scaleanastacia should be assessed next visit.        The  patient will continue to benefit from skilled outpatient physical therapy in order to address the deficits listed in the problem list on the initial evaluation, provide patient and family education, and maximize the patients level of independence in the home and community environments.     The patient's spiritual, cultural, and educational needs were considered, and the patient is agreeable to the plan of care and goals.           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  (Progressing)       Start:  07/11/25    Expected End:  10/10/25            Patient will demonstrate independence in home program for support of progression (Progressing)       Start:  07/11/25    Expected End:  08/11/25               Pain       Patient will report pain of 2/10 demonstrating a reduction of overall pain (Progressing)       Start:  07/11/25    Expected End:  08/11/25               Strength       Patient will achieve right upper trap strength of 4/5 (Progressing)       Start:  07/11/25    Expected End:  10/10/25            Patient will achieve right serratus anterior strength of 4/5 (Progressing)       Start:  07/11/25    Expected End:  10/10/25                Aravind Peterson PT

## 2025-07-24 ENCOUNTER — CLINICAL SUPPORT (OUTPATIENT)
Dept: REHABILITATION | Facility: HOSPITAL | Age: 60
End: 2025-07-24
Payer: COMMERCIAL

## 2025-07-24 DIAGNOSIS — R29.898 DECREASED STRENGTH OF UPPER EXTREMITY: Primary | ICD-10-CM

## 2025-07-24 PROCEDURE — 97112 NEUROMUSCULAR REEDUCATION: CPT

## 2025-07-24 PROCEDURE — 97140 MANUAL THERAPY 1/> REGIONS: CPT

## 2025-07-24 NOTE — PROGRESS NOTES
"Physical Therapy Visit    Patient Name: Erick Espinosa  MRN: [id number]  YOB: 1965  Encounter Date: 7/24/2025    Therapy Diagnosis:   Encounter Diagnosis   Name Primary?    Decreased strength of upper extremity Yes     Physician: Malgorzata Hall MD    Physician Orders: Eval and Treat  Medical Diagnosis: Shoulder impingement syndrome, right  Rotator cuff tendinitis, right  Surgical Diagnosis: Not applicable for this Episode   Surgical Date: Not applicable for this Episode  Days Since Last Surgery: Not applicable for this Episode    Visit # / Visits Authorized:  4 / 10  Insurance Authorization Period: 7/2/2025 to 12/31/2025  Date of Evaluation: 7/9/2025  Plan of Care Certification: 7/11/2025 to 10/11/2025      PT/PTA:     Number of PTA visits since last PT visit:   Time In: 0802   Time Out: 0855  Total Time (in minutes): 53   Total Billable Time (in minutes): 53    FOTO:  Intake Score (%): 46  Survey Score 2 (%): Not applicable for this Episode  Survey Score 3 (%): Not applicable for this Episode    Precautions:         Subjective   Erick reported having some pain in his collar bone yesterday..         Objective            Treatment:  PT extender available to assist with treatment as needed    Manual Therapy  MT 3: CTJ gapping grade 3  Balance/Neuromuscular Re-Education  NMR 3: HHR 3x10  NMR 4: Quadruped thread the needle 20x B  NMR 6: DNF with OTB shoulder flexion 4 x 8  NMR 7: ER OTB 3x15  NMR 8: Hand cuff flexion YTB 4x10  NMR 9: Chin tucks with rotation 20x5"    Time Entry(in minutes):  Manual Therapy Time Entry: 8  Neuromuscular Re-Education Time Entry: 45    Assessment & Plan   Assessment: Erick presented with decrease in symptoms today, but mild sternoclavicular symptoms yesterday. These symptoms were reproduced with CKC exercises early in treatment, but decreased after cervical rotation exercises. Additionally, he had less pain with overhead reaches with the addition of isometric external rotation    "     The patient will continue to benefit from skilled outpatient physical therapy in order to address the deficits listed in the problem list on the initial evaluation, provide patient and family education, and maximize the patients level of independence in the home and community environments.     The patient's spiritual, cultural, and educational needs were considered, and the patient is agreeable to the plan of care and goals.           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  (Progressing)       Start:  07/11/25    Expected End:  10/10/25            Patient will demonstrate independence in home program for support of progression (Progressing)       Start:  07/11/25    Expected End:  08/11/25               Pain       Patient will report pain of 2/10 demonstrating a reduction of overall pain (Progressing)       Start:  07/11/25    Expected End:  08/11/25               Strength       Patient will achieve right upper trap strength of 4/5 (Progressing)       Start:  07/11/25    Expected End:  10/10/25            Patient will achieve right serratus anterior strength of 4/5 (Progressing)       Start:  07/11/25    Expected End:  10/10/25                Aravind Peterson PT

## 2025-07-29 ENCOUNTER — CLINICAL SUPPORT (OUTPATIENT)
Dept: REHABILITATION | Facility: HOSPITAL | Age: 60
End: 2025-07-29
Attending: ORTHOPAEDIC SURGERY
Payer: COMMERCIAL

## 2025-07-29 DIAGNOSIS — R29.898 DECREASED STRENGTH OF UPPER EXTREMITY: Primary | ICD-10-CM

## 2025-07-29 PROCEDURE — 97140 MANUAL THERAPY 1/> REGIONS: CPT

## 2025-07-29 PROCEDURE — 97112 NEUROMUSCULAR REEDUCATION: CPT

## 2025-07-29 NOTE — PROGRESS NOTES
"Physical Therapy Visit    Patient Name: Erick Espinosa  MRN: [id number]  YOB: 1965  Encounter Date: 7/29/2025    Therapy Diagnosis:   Encounter Diagnosis   Name Primary?    Decreased strength of upper extremity Yes     Physician: Malgorzata Hall MD    Physician Orders: Eval and Treat  Medical Diagnosis: Shoulder impingement syndrome, right  Rotator cuff tendinitis, right  Surgical Diagnosis: Not applicable for this Episode   Surgical Date: Not applicable for this Episode  Days Since Last Surgery: Not applicable for this Episode    Visit # / Visits Authorized:  5 / 10  Insurance Authorization Period: 7/2/2025 to 12/31/2025  Date of Evaluation: 7/9/2025  Plan of Care Certification: 7/11/2025 to 10/11/2025      PT/PTA:     Number of PTA visits since last PT visit:   Time In: 0802   Time Out: 0855  Total Time (in minutes): 53   Total Billable Time (in minutes): 53    FOTO:  Intake Score (%): 46  Survey Score 2 (%): Not applicable for this Episode  Survey Score 3 (%): Not applicable for this Episode    Precautions:         Subjective   Erick reports that he had pain for a couple days after last visit, but then had a few days of no pain at all..         Objective            Treatment:  Manual Therapy  MT 1: Seated CT extensions feet elevated  MT 2: Upper cervical rotation MET  MT 3: CTJ gapping grade 3  Balance/Neuromuscular Re-Education  NMR 1: Upper trap 1 3x10  NMR 2: Open books RTB 3x10 bilateral  NMR 6: DNF with OTB shoulder flexion 4 x 12  NMR 7: ER with upper cut OTB 3x10  NMR 8: Hand cuff flexion YTB 4x10  NMR 9: Chin tucks with rotation 20x5"    Time Entry(in minutes):  Manual Therapy Time Entry: 8  Neuromuscular Re-Education Time Entry: 45    Assessment & Plan   Assessment: Erick presents with decreased symptoms. He continues to have hypomobility through CT junction and upper cervical segments. Symptoms posteriorly have decreased overall and he is tolerating exercises better.        The patient will " continue to benefit from skilled outpatient physical therapy in order to address the deficits listed in the problem list on the initial evaluation, provide patient and family education, and maximize the patients level of independence in the home and community environments.     The patient's spiritual, cultural, and educational needs were considered, and the patient is agreeable to the plan of care and goals.           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  (Progressing)       Start:  07/11/25    Expected End:  10/10/25            Patient will demonstrate independence in home program for support of progression (Progressing)       Start:  07/11/25    Expected End:  08/11/25               Pain       Patient will report pain of 2/10 demonstrating a reduction of overall pain (Progressing)       Start:  07/11/25    Expected End:  08/11/25               Strength       Patient will achieve right upper trap strength of 4/5 (Progressing)       Start:  07/11/25    Expected End:  10/10/25            Patient will achieve right serratus anterior strength of 4/5 (Progressing)       Start:  07/11/25    Expected End:  10/10/25                Aravind Peterson PT

## 2025-07-31 ENCOUNTER — CLINICAL SUPPORT (OUTPATIENT)
Dept: REHABILITATION | Facility: HOSPITAL | Age: 60
End: 2025-07-31
Payer: COMMERCIAL

## 2025-07-31 DIAGNOSIS — R29.898 DECREASED STRENGTH OF UPPER EXTREMITY: Primary | ICD-10-CM

## 2025-07-31 PROCEDURE — 97112 NEUROMUSCULAR REEDUCATION: CPT

## 2025-07-31 PROCEDURE — 97140 MANUAL THERAPY 1/> REGIONS: CPT

## 2025-07-31 NOTE — PROGRESS NOTES
Physical Therapy Visit    Patient Name: Erick Espinosa  MRN: [id number]  YOB: 1965  Encounter Date: 7/31/2025    Therapy Diagnosis:   Encounter Diagnosis   Name Primary?    Decreased strength of upper extremity Yes     Physician: Malgorzata Hall MD    Physician Orders: Eval and Treat  Medical Diagnosis: Shoulder impingement syndrome, right  Rotator cuff tendinitis, right  Surgical Diagnosis: Not applicable for this Episode   Surgical Date: Not applicable for this Episode  Days Since Last Surgery: Not applicable for this Episode    Visit # / Visits Authorized:  6 / 10  Insurance Authorization Period: 7/2/2025 to 12/31/2025  Date of Evaluation: 7/9/2025  Plan of Care Certification: 7/11/2025 to 10/11/2025      PT/PTA:     Number of PTA visits since last PT visit:   Time In: 0802   Time Out: 0855  Total Time (in minutes): 53   Total Billable Time (in minutes): 53    FOTO:  Intake Score (%): 46  Survey Score 2 (%): Not applicable for this Episode  Survey Score 3 (%): Not applicable for this Episode    Precautions:         Subjective   Erick reports continuing to have pain, but it feels less severe..         Objective            Treatment:  Manual Therapy  MT 2: Upper cervical rotation MET  MT 3: CTJ gapping grade 3  MT 4: SC inferior mobs grade 3  Balance/Neuromuscular Re-Education  NMR 2: Open books RTB 3x10 bilateral  NMR 6: DNF with OTB shoulder flexion 4 x 8  NMR 7: DNF with OTB horizonatl abduction 4x8  NMR 8: Hand cuff flexion YTB 4x8  NMR 9: Chin tucks with biofeeback 5x10    Time Entry(in minutes):  Manual Therapy Time Entry: 8  Neuromuscular Re-Education Time Entry: 45    Assessment & Plan   Assessment: Erick presents with hypomobility at upper cervical and CTJ. These improved after manual and mobility exercises. He was able to maintain chin tuck positioning with upper extremity exercises better after performing biofeedback.        The patient will continue to benefit from skilled outpatient physical  therapy in order to address the deficits listed in the problem list on the initial evaluation, provide patient and family education, and maximize the patients level of independence in the home and community environments.     The patient's spiritual, cultural, and educational needs were considered, and the patient is agreeable to the plan of care and goals.           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  (Progressing)       Start:  07/11/25    Expected End:  10/10/25            Patient will demonstrate independence in home program for support of progression (Progressing)       Start:  07/11/25    Expected End:  08/11/25               Pain       Patient will report pain of 2/10 demonstrating a reduction of overall pain (Progressing)       Start:  07/11/25    Expected End:  08/11/25               Strength       Patient will achieve right upper trap strength of 4/5 (Progressing)       Start:  07/11/25    Expected End:  10/10/25            Patient will achieve right serratus anterior strength of 4/5 (Progressing)       Start:  07/11/25    Expected End:  10/10/25                Aravind Peterson PT

## 2025-08-05 ENCOUNTER — CLINICAL SUPPORT (OUTPATIENT)
Dept: REHABILITATION | Facility: HOSPITAL | Age: 60
End: 2025-08-05
Payer: COMMERCIAL

## 2025-08-05 DIAGNOSIS — R29.898 DECREASED STRENGTH OF UPPER EXTREMITY: Primary | ICD-10-CM

## 2025-08-05 PROCEDURE — 97112 NEUROMUSCULAR REEDUCATION: CPT

## 2025-08-05 PROCEDURE — 97140 MANUAL THERAPY 1/> REGIONS: CPT

## 2025-08-05 NOTE — PROGRESS NOTES
Physical Therapy Visit    Patient Name: Erick Espinosa  MRN: [id number]  YOB: 1965  Encounter Date: 8/5/2025    Therapy Diagnosis:   Encounter Diagnosis   Name Primary?    Decreased strength of upper extremity Yes     Physician: Malgorzata Hall MD    Physician Orders: Eval and Treat  Medical Diagnosis: Shoulder impingement syndrome, right  Rotator cuff tendinitis, right  Surgical Diagnosis: Not applicable for this Episode   Surgical Date: Not applicable for this Episode  Days Since Last Surgery: Not applicable for this Episode    Visit # / Visits Authorized:  7 / 10  Insurance Authorization Period: 7/2/2025 to 12/31/2025  Date of Evaluation: 7/9/2025  Plan of Care Certification: 7/11/2025 to 10/11/2025      PT/PTA:     Number of PTA visits since last PT visit:   Time In: 1000   Time Out: 1053  Total Time (in minutes): 53   Total Billable Time (in minutes): 53    FOTO:  Intake Score (%): 46  Survey Score 2 (%): Not applicable for this Episode  Survey Score 3 (%): Not applicable for this Episode    Precautions:         Subjective   Erick reports having more pain in his neck today..         Objective            Treatment:  PT extender available to assist with treatment as needed'  Manual Therapy  MT 2: Upper cervical flexion mob grade 3  MT 3: CTJ gapping grade 3  Balance/Neuromuscular Re-Education  NMR 2: Open books RTB 3x10 bilateral  NMR 6: DNF with OTB shoulder flexion 4 x 8  NMR 7: DNF with OTB horizonatl abduction 4x8  NMR 8: Hand cuff flexion YTB 4x8  NMR 9: Chin tucks with biofeeback 5x10    Time Entry(in minutes):  Manual Therapy Time Entry: 8  Neuromuscular Re-Education Time Entry: 45    Assessment & Plan   Assessment: Erick continues to have hypomobility at upper cervical and CTJ. He was able to maintain chin tuck when given ball to press into the wall. 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 We will assess how removing this stimulus affects his symptoms.         The patient will continue to benefit from skilled outpatient physical therapy in order to address the deficits listed in the problem list on the initial evaluation, provide patient and family education, and maximize the patients level of independence in the home and community environments.     The patient's spiritual, cultural, and educational needs were considered, and the patient is agreeable to the plan of care and goals.           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  (Progressing)       Start:  07/11/25    Expected End:  10/10/25            Patient will demonstrate independence in home program for support of progression (Progressing)       Start:  07/11/25    Expected End:  08/11/25               Pain       Patient will report pain of 2/10 demonstrating a reduction of overall pain (Progressing)       Start:  07/11/25    Expected End:  08/11/25               Strength       Patient will achieve right upper trap strength of 4/5 (Progressing)       Start:  07/11/25    Expected End:  10/10/25            Patient will achieve right serratus anterior strength of 4/5 (Progressing)       Start:  07/11/25    Expected End:  10/10/25                Aravind Peterson PT

## 2025-08-07 ENCOUNTER — CLINICAL SUPPORT (OUTPATIENT)
Dept: REHABILITATION | Facility: HOSPITAL | Age: 60
End: 2025-08-07
Payer: COMMERCIAL

## 2025-08-07 DIAGNOSIS — R29.898 DECREASED STRENGTH OF UPPER EXTREMITY: Primary | ICD-10-CM

## 2025-08-07 PROCEDURE — 97140 MANUAL THERAPY 1/> REGIONS: CPT

## 2025-08-07 PROCEDURE — 97112 NEUROMUSCULAR REEDUCATION: CPT

## 2025-08-07 PROCEDURE — 97110 THERAPEUTIC EXERCISES: CPT

## 2025-08-07 NOTE — PROGRESS NOTES
Physical Therapy Progress Note    Patient Name: Erick Espinosa  MRN: [id number]  YOB: 1965  Encounter Date: 8/7/2025    Therapy Diagnosis:   Encounter Diagnosis   Name Primary?    Decreased strength of upper extremity Yes     Physician: Malgorzata Hall MD    Physician Orders: Eval and Treat  Medical Diagnosis: Shoulder impingement syndrome, right  Rotator cuff tendinitis, right  Surgical Diagnosis: Not applicable for this Episode   Surgical Date: Not applicable for this Episode  Days Since Last Surgery: Not applicable for this Episode    Visit # / Visits Authorized:  8 / 10  Insurance Authorization Period: 7/2/2025 to 12/31/2025  Date of Evaluation: 7/9/2025  Plan of Care Certification: 7/11/2025 to 10/11/2025   Progress Note Date Range: 7/11/2025 to 8/7/2025     PT/PTA:     Number of PTA visits since last PT visit:   Time In: 1305   Time Out: 1358  Total Time (in minutes): 53   Total Billable Time (in minutes): 53    FOTO:  Intake Score (%): 46  Survey Score 2 (%): Not applicable for this Episode  Survey Score 3 (%): Not applicable for this Episode    Precautions:       Subjective   Erick is having less neck pain and feels most symptoms in his shoulder..         Objective      Subcranial Range of Motion   Active Restricted? Passive Restricted? Pain   Flexion Yes Yes     Protraction No No     Retraction Yes Yes       Cervical Range of Motion   Active (deg) Passive (deg) Pain   Flexion 75       Extension 75       Right Lateral Flexion 75       Right Rotation 75       Left Lateral Flexion 75       Left Rotation 75         Above numbers indicate percentage of full ROM. Skin crease and hinge point at lower cervical spine. Hypomobile at C2/C3 with extension side glides.             Treatment:  Therapeutic Exercise  TE 1: Assessment  Manual Therapy  MT 3: CTJ gapping grade 3  MT 5: Left side glides C2/C3 grade 3-4  Balance/Neuromuscular Re-Education  NMR 2: Kneeling open books 2x10 bilateral  NMR 6: DNF with OTB  shoulder flexion 4 x 8  NMR 7: DNF with OTB horizonatl abduction 4x8  NMR 8: Hand cuff flexion YTB 4x8  NMR 10: YTB serratus wall slides 4x8    Time Entry(in minutes):  Manual Therapy Time Entry: 12  Neuromuscular Re-Education Time Entry: 31  Therapeutic Exercise Time Entry: 10    Assessment & Plan   Assessment: Erick's upper cervical hypomobility improved after side glides. Overall, his symptoms have improved and are localized to posterior right shoulder. He is improving with maintaining chin tucks with upper extremity movements, but still requires cueing. Overall, he is progressing.        The patient will continue to benefit from skilled outpatient physical therapy in order to address the deficits listed in the problem list on the initial evaluation, provide patient and family education, and maximize the patients level of independence in the home and community environments.     The patient's spiritual, cultural, and educational needs were considered, and the patient is agreeable to the plan of care and goals.           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  (Progressing)       Start:  07/11/25    Expected End:  10/10/25            Patient will demonstrate independence in home program for support of progression (Met)       Start:  07/11/25    Expected End:  08/11/25    Resolved:  08/07/25            Pain       Patient will report pain of 2/10 demonstrating a reduction of overall pain (Progressing)       Start:  07/11/25    Expected End:  08/11/25               Strength       Patient will achieve right upper trap strength of 4/5 (Progressing)       Start:  07/11/25    Expected End:   10/10/25            Patient will achieve right serratus anterior strength of 4/5 (Progressing)       Start:  07/11/25    Expected End:  10/10/25                Aravind Peterson PT

## (undated) DEVICE — SEE MEDLINE ITEM 157117

## (undated) DEVICE — SEE MEDLINE ITEM 146347

## (undated) DEVICE — SOL 9P NACL IRR PIC IL

## (undated) DEVICE — NDL 18GA X1 1/2 REG BEVEL

## (undated) DEVICE — Device

## (undated) DEVICE — GLOVE SURGEON SYN PF SZ 9

## (undated) DEVICE — DRESSING XEROFORM 1X8IN

## (undated) DEVICE — PAD SHOULDER CARE POLAR

## (undated) DEVICE — SHAVER ULTRAFFR 4.2MM

## (undated) DEVICE — DRAPE STERI INSTRUMENT 1018

## (undated) DEVICE — SEE MEDLINE ITEM 152530

## (undated) DEVICE — POSITIONER IV ARMBOARD FOAM

## (undated) DEVICE — PROBE ARTHO ENERGY 90 DEG

## (undated) DEVICE — PAD ELECTRODE STER 1.5X3

## (undated) DEVICE — GLOVE ORTHO PF SZ 8.5

## (undated) DEVICE — KIT SHOULDER POSITIONER SPIDER

## (undated) DEVICE — GLOVE BIOGEL SKINSENSE PI 7.0

## (undated) DEVICE — STRIP STERI REIN CLSR 1/2X2IN

## (undated) DEVICE — BLADE SHAVER 4.5 6/BX

## (undated) DEVICE — SUT 1 36IN PDS II VIO MONO

## (undated) DEVICE — DRESSING XEROFORM FOIL PK 1X8

## (undated) DEVICE — SUT VICRYL PLUS 0 CT1 18IN

## (undated) DEVICE — SYR 10CC LUER LOCK

## (undated) DEVICE — APPLICATOR CHLORAPREP ORN 26ML

## (undated) DEVICE — SUPPORT SLING SHOT II MEDIUM

## (undated) DEVICE — SEE MEDLINE ITEM 152529

## (undated) DEVICE — SUT MCRYL PLUS 4-0 PS2 27IN

## (undated) DEVICE — SEE MEDLINE ITEM 146313

## (undated) DEVICE — GOWN SMART IMP BREATHABLE XXLG

## (undated) DEVICE — TUBE SET INFLOW/OUTFLOW

## (undated) DEVICE — UNDERGLOVES BIOGEL PI SIZE 7.5

## (undated) DEVICE — CLOSURE SKIN STERI STRIP 1/2X4

## (undated) DEVICE — ADHESIVE MASTISOL VIAL 48/BX

## (undated) DEVICE — SOL IRR NACL .9% 3000ML

## (undated) DEVICE — GAUZE SPONGE 4X4 12PLY

## (undated) DEVICE — PAD ABD 8X10 STERILE

## (undated) DEVICE — DRAPE STERI U-SHAPED 47X51IN

## (undated) DEVICE — SEE MEDLINE ITEM 146420